# Patient Record
Sex: MALE | Race: WHITE | NOT HISPANIC OR LATINO | Employment: UNEMPLOYED | URBAN - METROPOLITAN AREA
[De-identification: names, ages, dates, MRNs, and addresses within clinical notes are randomized per-mention and may not be internally consistent; named-entity substitution may affect disease eponyms.]

---

## 2017-10-26 ENCOUNTER — GENERIC CONVERSION - ENCOUNTER (OUTPATIENT)
Dept: OTHER | Facility: OTHER | Age: 22
End: 2017-10-26

## 2017-10-26 ENCOUNTER — APPOINTMENT (EMERGENCY)
Dept: RADIOLOGY | Facility: HOSPITAL | Age: 22
DRG: 280 | End: 2017-10-26

## 2017-10-26 ENCOUNTER — APPOINTMENT (EMERGENCY)
Dept: NON INVASIVE DIAGNOSTICS | Facility: HOSPITAL | Age: 22
DRG: 280 | End: 2017-10-26

## 2017-10-26 ENCOUNTER — HOSPITAL ENCOUNTER (INPATIENT)
Facility: HOSPITAL | Age: 22
LOS: 2 days | Discharge: HOME | DRG: 280 | End: 2017-10-28
Attending: EMERGENCY MEDICINE | Admitting: STUDENT IN AN ORGANIZED HEALTH CARE EDUCATION/TRAINING PROGRAM

## 2017-10-26 DIAGNOSIS — I40.9 SUBACUTE MYOCARDITIS, UNSPECIFIED MYOCARDITIS TYPE: Primary | ICD-10-CM

## 2017-10-26 PROBLEM — I42.9 CARDIOMYOPATHY (HCC): Status: ACTIVE | Noted: 2017-10-26

## 2017-10-26 PROBLEM — I21.4 NSTEMI (NON-ST ELEVATED MYOCARDIAL INFARCTION) (HCC): Status: ACTIVE | Noted: 2017-10-26

## 2017-10-26 PROBLEM — R07.9 CHEST PAIN: Status: ACTIVE | Noted: 2017-10-26

## 2017-10-26 LAB
ALBUMIN SERPL BCP-MCNC: 3.8 G/DL (ref 3.5–5)
ALP SERPL-CCNC: 99 U/L (ref 46–116)
ALT SERPL W P-5'-P-CCNC: 63 U/L (ref 12–78)
AMPHETAMINES SERPL QL SCN: NEGATIVE
ANION GAP SERPL CALCULATED.3IONS-SCNC: 7 MMOL/L (ref 4–13)
APTT PPP: 36 SECONDS (ref 23–35)
APTT PPP: 42 SECONDS (ref 23–35)
AST SERPL W P-5'-P-CCNC: 143 U/L (ref 5–45)
ATRIAL RATE: 101 BPM
BARBITURATES UR QL: NEGATIVE
BASOPHILS # BLD AUTO: 0.04 THOUSANDS/ΜL (ref 0–0.1)
BASOPHILS NFR BLD AUTO: 0 % (ref 0–1)
BENZODIAZ UR QL: NEGATIVE
BILIRUB SERPL-MCNC: 0.5 MG/DL (ref 0.2–1)
BUN SERPL-MCNC: 8 MG/DL (ref 5–25)
CALCIUM SERPL-MCNC: 9.1 MG/DL (ref 8.3–10.1)
CHLORIDE SERPL-SCNC: 99 MMOL/L (ref 100–108)
CO2 SERPL-SCNC: 31 MMOL/L (ref 21–32)
COCAINE UR QL: NEGATIVE
CREAT SERPL-MCNC: 0.99 MG/DL (ref 0.6–1.3)
CRP SERPL QL: >90 MG/L
EOSINOPHIL # BLD AUTO: 0.07 THOUSAND/ΜL (ref 0–0.61)
EOSINOPHIL NFR BLD AUTO: 1 % (ref 0–6)
ERYTHROCYTE [DISTWIDTH] IN BLOOD BY AUTOMATED COUNT: 12.9 % (ref 11.6–15.1)
ERYTHROCYTE [DISTWIDTH] IN BLOOD BY AUTOMATED COUNT: 13 % (ref 11.6–15.1)
ERYTHROCYTE [SEDIMENTATION RATE] IN BLOOD: 7 MM/HOUR (ref 0–10)
GFR SERPL CREATININE-BSD FRML MDRD: 109 ML/MIN/1.73SQ M
GLUCOSE SERPL-MCNC: 122 MG/DL (ref 65–140)
HCT VFR BLD AUTO: 46.7 % (ref 36.5–49.3)
HCT VFR BLD AUTO: 49.1 % (ref 36.5–49.3)
HGB BLD-MCNC: 15.9 G/DL (ref 12–17)
HGB BLD-MCNC: 16.7 G/DL (ref 12–17)
INR PPP: 0.99 (ref 0.86–1.16)
LACTATE SERPL-SCNC: 0.9 MMOL/L (ref 0.5–2)
LYMPHOCYTES # BLD AUTO: 1.11 THOUSANDS/ΜL (ref 0.6–4.47)
LYMPHOCYTES NFR BLD AUTO: 10 % (ref 14–44)
MCH RBC QN AUTO: 29.7 PG (ref 26.8–34.3)
MCH RBC QN AUTO: 29.7 PG (ref 26.8–34.3)
MCHC RBC AUTO-ENTMCNC: 34 G/DL (ref 31.4–37.4)
MCHC RBC AUTO-ENTMCNC: 34 G/DL (ref 31.4–37.4)
MCV RBC AUTO: 87 FL (ref 82–98)
MCV RBC AUTO: 87 FL (ref 82–98)
METHADONE UR QL: NEGATIVE
MONOCYTES # BLD AUTO: 1.19 THOUSAND/ΜL (ref 0.17–1.22)
MONOCYTES NFR BLD AUTO: 11 % (ref 4–12)
NEUTROPHILS # BLD AUTO: 8.62 THOUSANDS/ΜL (ref 1.85–7.62)
NEUTS SEG NFR BLD AUTO: 78 % (ref 43–75)
NRBC BLD AUTO-RTO: 0 /100 WBCS
OPIATES UR QL SCN: NEGATIVE
P AXIS: 76 DEGREES
PCP UR QL: NEGATIVE
PLATELET # BLD AUTO: 213 THOUSANDS/UL (ref 149–390)
PLATELET # BLD AUTO: 228 THOUSANDS/UL (ref 149–390)
PMV BLD AUTO: 10 FL (ref 8.9–12.7)
PMV BLD AUTO: 10.1 FL (ref 8.9–12.7)
POTASSIUM SERPL-SCNC: 3.6 MMOL/L (ref 3.5–5.3)
PR INTERVAL: 114 MS
PROT SERPL-MCNC: 7.9 G/DL (ref 6.4–8.2)
PROTHROMBIN TIME: 13.3 SECONDS (ref 12.1–14.4)
QRS AXIS: -60 DEGREES
QRSD INTERVAL: 76 MS
QT INTERVAL: 322 MS
QTC INTERVAL: 417 MS
RBC # BLD AUTO: 5.36 MILLION/UL (ref 3.88–5.62)
RBC # BLD AUTO: 5.62 MILLION/UL (ref 3.88–5.62)
SODIUM SERPL-SCNC: 137 MMOL/L (ref 136–145)
T WAVE AXIS: 62 DEGREES
THC UR QL: NEGATIVE
TROPONIN I SERPL-MCNC: 15.47 NG/ML
TROPONIN I SERPL-MCNC: 16.22 NG/ML
TROPONIN I SERPL-MCNC: 21.61 NG/ML
TROPONIN I SERPL-MCNC: 21.82 NG/ML
VENTRICULAR RATE: 101 BPM
WBC # BLD AUTO: 11.07 THOUSAND/UL (ref 4.31–10.16)
WBC # BLD AUTO: 8.79 THOUSAND/UL (ref 4.31–10.16)

## 2017-10-26 PROCEDURE — 85027 COMPLETE CBC AUTOMATED: CPT | Performed by: NURSE PRACTITIONER

## 2017-10-26 PROCEDURE — 85610 PROTHROMBIN TIME: CPT | Performed by: NURSE PRACTITIONER

## 2017-10-26 PROCEDURE — 87476 LYME DIS DNA AMP PROBE: CPT | Performed by: NURSE PRACTITIONER

## 2017-10-26 PROCEDURE — 71020 HB CHEST X-RAY 2VW FRONTAL&LATL: CPT | Performed by: EMERGENCY MEDICINE

## 2017-10-26 PROCEDURE — 85730 THROMBOPLASTIN TIME PARTIAL: CPT | Performed by: NURSE PRACTITIONER

## 2017-10-26 PROCEDURE — 85652 RBC SED RATE AUTOMATED: CPT | Performed by: NURSE PRACTITIONER

## 2017-10-26 PROCEDURE — 84484 ASSAY OF TROPONIN QUANT: CPT | Performed by: STUDENT IN AN ORGANIZED HEALTH CARE EDUCATION/TRAINING PROGRAM

## 2017-10-26 PROCEDURE — 87633 RESP VIRUS 12-25 TARGETS: CPT | Performed by: NURSE PRACTITIONER

## 2017-10-26 PROCEDURE — 36415 COLL VENOUS BLD VENIPUNCTURE: CPT

## 2017-10-26 PROCEDURE — 80307 DRUG TEST PRSMV CHEM ANLYZR: CPT | Performed by: NURSE PRACTITIONER

## 2017-10-26 PROCEDURE — 85025 COMPLETE CBC W/AUTO DIFF WBC: CPT | Performed by: EMERGENCY MEDICINE

## 2017-10-26 PROCEDURE — 86140 C-REACTIVE PROTEIN: CPT | Performed by: NURSE PRACTITIONER

## 2017-10-26 PROCEDURE — 84484 ASSAY OF TROPONIN QUANT: CPT | Performed by: EMERGENCY MEDICINE

## 2017-10-26 PROCEDURE — 84484 ASSAY OF TROPONIN QUANT: CPT | Performed by: NURSE PRACTITIONER

## 2017-10-26 PROCEDURE — 93306 TTE W/DOPPLER COMPLETE: CPT

## 2017-10-26 PROCEDURE — 83605 ASSAY OF LACTIC ACID: CPT | Performed by: NURSE PRACTITIONER

## 2017-10-26 PROCEDURE — 80053 COMPREHEN METABOLIC PANEL: CPT | Performed by: EMERGENCY MEDICINE

## 2017-10-26 PROCEDURE — 93005 ELECTROCARDIOGRAM TRACING: CPT | Performed by: EMERGENCY MEDICINE

## 2017-10-26 PROCEDURE — 86658 ENTEROVIRUS ANTIBODY: CPT | Performed by: NURSE PRACTITIONER

## 2017-10-26 PROCEDURE — 99285 EMERGENCY DEPT VISIT HI MDM: CPT

## 2017-10-26 RX ORDER — CHLORHEXIDINE GLUCONATE 0.12 MG/ML
15 RINSE ORAL EVERY 12 HOURS SCHEDULED
Status: DISCONTINUED | OUTPATIENT
Start: 2017-10-26 | End: 2017-10-27

## 2017-10-26 RX ORDER — HEPARIN SODIUM 10000 [USP'U]/100ML
3-20 INJECTION, SOLUTION INTRAVENOUS
Status: DISCONTINUED | OUTPATIENT
Start: 2017-10-26 | End: 2017-10-27

## 2017-10-26 RX ORDER — ASPIRIN 81 MG/1
81 TABLET, CHEWABLE ORAL DAILY
Status: DISCONTINUED | OUTPATIENT
Start: 2017-10-26 | End: 2017-10-28

## 2017-10-26 RX ORDER — HEPARIN SODIUM 1000 [USP'U]/ML
3000 INJECTION, SOLUTION INTRAVENOUS; SUBCUTANEOUS AS NEEDED
Status: DISCONTINUED | OUTPATIENT
Start: 2017-10-26 | End: 2017-10-27

## 2017-10-26 RX ORDER — HEPARIN SODIUM 1000 [USP'U]/ML
1500 INJECTION, SOLUTION INTRAVENOUS; SUBCUTANEOUS AS NEEDED
Status: DISCONTINUED | OUTPATIENT
Start: 2017-10-26 | End: 2017-10-27

## 2017-10-26 RX ADMIN — CHLORHEXIDINE GLUCONATE 15 ML: 1.2 RINSE ORAL at 14:39

## 2017-10-26 RX ADMIN — CHLORHEXIDINE GLUCONATE 15 ML: 1.2 RINSE ORAL at 20:16

## 2017-10-26 RX ADMIN — HEPARIN SODIUM 3000 UNITS: 1000 INJECTION, SOLUTION INTRAVENOUS; SUBCUTANEOUS at 20:16

## 2017-10-26 RX ADMIN — HEPARIN SODIUM AND DEXTROSE 12 UNITS/KG/HR: 10000; 5 INJECTION INTRAVENOUS at 14:14

## 2017-10-26 RX ADMIN — ASPIRIN 81 MG 81 MG: 81 TABLET ORAL at 14:12

## 2017-10-26 RX ADMIN — METOPROLOL TARTRATE 12.5 MG: 25 TABLET ORAL at 14:13

## 2017-10-26 NOTE — SOCIAL WORK
CM met with pt and mother Lisa Campos at bedside  Pt lives with his mother in a 2 story house with 10 steps w/railing to reach his bedroom and 3 steps w/railing to enter the residence  Pt has no problem navigating steps and takes care of all ADL's  He uses no DME's  He has never been in rehab or used OP/PT or [facility] services  Denies issues with drugs or alcohol  Denies hx of anxiety or depression  He uses [address] in [facility] and has no problem with his co-pays  He does not have a POA or Advanced Directive and does not want info at this time  He does not work, but does drive  Mother will transport home upon discharge  CM discussed discharge plan  Pt is concerned about not being a [place] citizen and is concerned that he has no insurance  CM will have Josephine Green from financial speak to pt for assistance  Pt is hoping to go home with no needs when medically cleared  CM will continue to follow

## 2017-10-26 NOTE — CONSULTS
Consultation - Cardiology Team One  Jane Marsh 23 y o  male MRN: [id number]  Unit/Bed#:  Encounter: [id number]    Consults    Physician Requesting Consult: Bindu Aguirre MD  Reason for Consult / Principal Problem: subacute myocarditis    HPI: Cardiologist Dr Chase Yanes is a 23y o  year old male who has no past medical history  Pt states for the last couple of weeks he has noticed increasing shortness of breath and chest pain  He notes just not feeling well lately  He states that he has been having chest pain off and on for 1 yr and was seen and advised to see cardiology but never did  He states the pain occurs at rest and exertion  He describes it as a pressure like pain on the left side of his chest  He states it is worse with inspiration and palpation  He also notes shortness of breath with exertion  He for the last couple of days he has been feeling dizzy  He states this am he was getting out of bed and was very dizzy, called for his dad and asked to come to ED  He admits to smoking cigarettes and drinking alcohol but denies illegal drugs  He has no family history of cad  REVIEW OF SYSTEMS:  Constitutional:  +malaise Denies fever or chills   Eyes:  Denies change in visual acuity   HENT:  Denies nasal congestion or sore throat   Respiratory: +shortness of breath  Denies cough   Cardiovascular: +chest pain Denies edema   GI:  Denies abdominal pain, nausea, vomiting, bloody stools or diarrhea   :  Denies dysuria, frequency, difficulty in micturition and nocturia  Musculoskeletal:  Denies back pain or joint pain   Neurologic: +dizziness Denies headache, focal weakness or sensory changes   Endocrine:  Denies polyuria or polydipsia   Lymphatic:  Denies swollen glands   Psychiatric:  Denies depression or anxiety     Historical Information   History reviewed  No pertinent past medical history  History reviewed  No pertinent surgical history    History   Alcohol Use No History   Drug Use No     History   Smoking Status    Never Smoker   Smokeless Tobacco    Never Used       Family History: History reviewed  No pertinent family history  MEDS & ALLERGIES:  all current active meds have been reviewed and current meds: Current Facility-Administered Medications   Medication Dose Route Frequency    aspirin chewable tablet 81 mg  81 mg Oral Daily    chlorhexidine (PERIDEX) 0 12 % oral rinse 15 mL  15 mL Swish & Spit Q12H [address]    heparin (porcine) 25,000 units in 250 mL infusion (premix)  3-20 Units/kg/hr (Order-Specific) Intravenous Titrated    heparin (porcine) injection 1,500 Units  1,500 Units Intravenous PRN    heparin (porcine) injection 3,000 Units  3,000 Units Intravenous PRN    metoprolol tartrate (LOPRESSOR) tablet 12 5 mg  12 5 mg Oral Q12H KAREL       heparin (porcine) 3-20 Units/kg/hr (Order-Specific)     No Known Allergies    OBJECTIVE:  Vitals:   Vitals:    10/26/17 1335   BP: 113/58   Pulse: 99   Resp: 18   Temp: 98 6 °F (37 °C)   SpO2: 97%     Body mass index is 21 73 kg/m²  Systolic (37TPC), DYF:109 , Min:112 , MALIKA:045     Diastolic (85EWQ), ACI:98, Min:58, Max:83    No intake or output data in the 24 hours ending 10/26/17 1343  Weight (last 2 days)     Date/Time   Weight    10/26/17 1335  52 2 (115)    10/26/17 1232  52 5 (115 74)    10/26/17 0924  52 2 (115)            Invasive Devices     Peripheral Intravenous Line            Peripheral IV 10/26/17 Right Antecubital less than 1 day                PHYSICAL EXAMS:  General:  Patient is not in acute distress, laying in the bed comfortably, awake, alert responding to commands  Head: Normocephalic, Atraumatic  HEENT: White sclera, pink conjunctiva,  PERRLA,pharynx benign  Neck:  Supple, no neck vein distention, carotids+2/+2 no bruits, thyromegaly, adenopathy  Respiratory: clear to P/A  Cardiovascular:  Pain noted with palpation and inspiration   PMI normal, S1-S2 normal, No  Murmurs, thrills, gallops, rubs   Regular rhythm  GI:  Abdomen soft nontender   No hepatosplenomegaly, adenopathy, ascites,or rebound tenderness  Extremities: No edema, normal pulses, no calf tenderness, no joint deformities, no venous disease   Integument:  No skin rashes or ulceration  Lymphatic:  No cervical or inguinal lymphadenopathy  Neurologic:  Patient is awake alert, responding to command, well-oriented to time and place and person moving all extremities    LABORATORY RESULTS:    Results from last 7 days  Lab Units 10/26/17  0935   TROPONIN I ng/mL 21 61*     CBC with diff:   Results from last 7 days  Lab Units 10/26/17  0935   WBC Thousand/uL 11 07*   HEMOGLOBIN g/dL 16 7   HEMATOCRIT % 49 1   MCV fL 87   PLATELETS Thousands/uL 228   MCH pg 29 7   MCHC g/dL 34 0   RDW % 12 9   MPV fL 10 0   NRBC AUTO /100 WBCs 0       CMP:  Results from last 7 days  Lab Units 10/26/17  0935   SODIUM mmol/L 137   POTASSIUM mmol/L 3 6   CHLORIDE mmol/L 99*   CO2 mmol/L 31   ANION GAP mmol/L 7   BUN mg/dL 8   CREATININE mg/dL 0 99   GLUCOSE RANDOM mg/dL 122   CALCIUM mg/dL 9 1   AST U/L 143*   ALT U/L 63   ALK PHOS U/L 99   TOTAL PROTEIN g/dL 7 9   ALBUMIN g/dL 3 8   BILIRUBIN TOTAL mg/dL 0 50   EGFR ml/min/1 73sq m 109       BMP:  Results from last 7 days  Lab Units 10/26/17  0935   SODIUM mmol/L 137   POTASSIUM mmol/L 3 6   CHLORIDE mmol/L 99*   CO2 mmol/L 31   BUN mg/dL 8   CREATININE mg/dL 0 99   GLUCOSE RANDOM mg/dL 122   CALCIUM mg/dL 9 1                              Cardiac testing:   Results for orders placed during the hospital encounter of 10/26/17   Echo complete with contrast if indicated    Narrative [address] [place] [address] [place] [id number] [phone]    Transthoracic Echocardiogram  2D, M-mode, Doppler, and Color Doppler    Study date:  26-Oct-2017    Patient: Obed Kawasaki  MR number: [id number]  Account number: [de-identified]  : 1995  Age: 23 years  Gender: Male  Status: Inpatient  Location: Emergency room  Height: 61 in  Weight: 115 lb  BP: 115/ 83 mmHg    Indications: Acute myocarditis, Assess left ventricular function  Diagnoses: I51 4 - Myocarditis, unspecified    Sonographer:  April [facility] Sia Murray  Interpreting Physician:  Caity Goddard MD  Referring Physician:  Edilia Merritt MD  Group:  [facility]    SUMMARY    LEFT VENTRICLE:  Systolic function was severely reduced  Ejection fraction was estimated to be 35 %  There were no regional wall motion abnormalities  There was severe diffuse hypokinesis  MITRAL VALVE:  There was mild regurgitation  TRICUSPID VALVE:  There was mild regurgitation  PULMONIC VALVE:  There was mild regurgitation  IVC, HEPATIC VEINS:  The inferior vena cava was moderately dilated  HISTORY: PRIOR HISTORY: Patient has no history of cardiovascular disease  PROCEDURE: The procedure was performed in the emergency room  This was a routine study  The transthoracic approach was used  The study included complete 2D imaging, M-mode, complete spectral Doppler, and color Doppler  The heart rate was  92 bpm, at the start of the study  Images were obtained from the parasternal, apical, subcostal, and suprasternal notch acoustic windows  Image quality was adequate  LEFT VENTRICLE: Size was normal  Systolic function was severely reduced  Ejection fraction was estimated to be 35 %  There were no regional wall motion abnormalities  There was severe diffuse hypokinesis  Wall thickness was normal   DOPPLER: Left ventricular diastolic function parameters were normal     RIGHT VENTRICLE: The size was normal  Systolic function was normal  Wall thickness was normal     LEFT ATRIUM: Size was normal     RIGHT ATRIUM: Size was normal     MITRAL VALVE: Valve structure was normal  There was normal leaflet separation  DOPPLER: The transmitral velocity was within the normal range  There was no evidence for stenosis   There was mild regurgitation  AORTIC VALVE: The valve was trileaflet  Leaflets exhibited normal thickness and normal cuspal separation  DOPPLER: Transaortic velocity was within the normal range  There was no evidence for stenosis  There was no regurgitation  TRICUSPID VALVE: The valve structure was normal  There was normal leaflet separation  DOPPLER: The transtricuspid velocity was within the normal range  There was no evidence for stenosis  There was mild regurgitation  PULMONIC VALVE: Leaflets exhibited normal thickness, no calcification, and normal cuspal separation  DOPPLER: The transpulmonic velocity was within the normal range  There was mild regurgitation  PERICARDIUM: There was no pericardial effusion  The pericardium was normal in appearance  AORTA: The root exhibited normal size  SYSTEMIC VEINS: IVC: The inferior vena cava was normal in size and course  The inferior vena cava was moderately dilated  Respirophasic changes were normal     SYSTEM MEASUREMENT TABLES    2D  %FS: 24 4 %  Ao Diam: 2 4 cm  EDV(Teich): 122 7 ml  EF(Teich): 48 1 %  ESV(Teich): 63 6 ml  IVSd: 0 6 cm  LA Area: 12 6 cm2  LA Diam: 2 8 cm  LVEDV MOD A4C: 100 9 ml  LVEF MOD A4C: 46 8 %  LVESV MOD A4C: 53 7 ml  LVIDd: 5 1 cm  LVIDs: 3 8 cm  LVLd A4C: 9 1 cm  LVLs A4C: 7 6 cm  LVPWd: 0 9 cm  RA Area: 15 cm2  RVIDd: 3 cm  SV MOD A4C: 47 2 ml  SV(Teich): 59 ml    CW  TR Vmax: 2 1 m/s  TR maxP mmHg    MM  TAPSE: 1 7 cm    PW  E': 0 1 m/s  E/E': 6 1  MV A Sudhakar: 0 6 m/s  MV Dec Hardin: 3 8 m/s2  MV DecT: 159 2 ms  MV E Sudhakar: 0 6 m/s  MV E/A Ratio: 1 1  MV PHT: 46 2 ms  MVA By PHT: 4 8 cm2    [employer] Accredited Echocardiography Laboratory    Prepared and electronically signed by    Yarely Lei MD  Signed 26-Oct-2017 12:55:23             Imaging:   I have personally reviewed pertinent reports  EKG reviewed personally:  Sr with subtle st elevated V1-V6    Telemetry reviewed personally:   SR    Assessment/Plan:  1  NSTEMI likely type 2 secondary to myocarditis -- trop 21  Trend trop  Stat echo done at the bedside : EF 35%, severe diffuse hypokinesis  Add heparin gtt, lopressor, ASA  Monitor vitals  Monitor ekg  CRP elevated  ESR negative  Lyme pending  Code Status: Level 1 - Full Code    Counseling / Coordination of Care  Total floor / unit time spent today 35 minutes  Greater than 50% of total time was spent with the patient and / or family counseling and / or coordination of care  A description of the counseling / coordination of care: Review of history, current assessment, development of a plan      Pretty Staley PA-C  10/26/2017,1:43 PM

## 2017-10-26 NOTE — H&P
History and Physical - [address]  Arden Haynes 23 y o  male MRN: [id number]  Unit/Bed#:  Encounter: [id number]    Reason for Admission / Chief Complaint: chest pain     History of Present Illness:  Josee Ricardo is a 23 y o  male who presents with no past medical history  He states that he had chest pain approximately 1 year ago, in which he sought care at an urgent care center  He does not recall the surrounding context of the chest pain at this time  But, feels that "it came out of no where"  He was encouraged to seek care from a cardiologist   However, he did not  He stated that he has had chest pain intermittently over the course of the year  Over the past couple of weeks he describes increasing shortness of breath and chest pain, associated with fatigue  He states that the pain is on the left side of his chest that worsens with pressure and inspiration  He had chest pain today that was associated with dizziness and he felt like he "he passed out for 5 seconds" and this had not happened before which prompted his admission  He admits to smoking cigarettes and occasionally drinking alcohol  He denies and ilicit drug use  He denies any sick contacts  He is not an athlete or play contact sports  He denies a family history of CAD  History obtained from chart review and the patient  Past Medical History:  History reviewed  No pertinent past medical history  Past Surgical History:  History reviewed  No pertinent surgical history  Past Family History:  History reviewed  No pertinent family history      Social History:  History   Smoking Status    Never Smoker   Smokeless Tobacco    Never Used     History   Alcohol Use No     History   Drug Use No     Medications:  Current Facility-Administered Medications   Medication Dose Route Frequency    aspirin chewable tablet 81 mg  81 mg Oral Daily    chlorhexidine (PERIDEX) 0 12 % oral rinse 15 mL  15 mL Swish & Spit Q12H [address]    heparin (porcine) 25,000 units in 250 mL infusion (premix)  3-20 Units/kg/hr (Order-Specific) Intravenous Titrated    heparin (porcine) injection 1,500 Units  1,500 Units Intravenous PRN    heparin (porcine) injection 3,000 Units  3,000 Units Intravenous PRN    metoprolol tartrate (LOPRESSOR) tablet 12 5 mg  12 5 mg Oral Q12H [address]     Home medications:  Prior to Admission medications    Not on File     Allergies:  No Known Allergies    ROS:   Review of Systems   Constitutional: Negative  HENT: Negative  Eyes: Negative  Respiratory: Positive for shortness of breath  Cardiovascular: Positive for chest pain  Gastrointestinal: Positive for nausea  Endocrine: Negative  Genitourinary: Negative  Musculoskeletal: Negative  Skin: Negative  Allergic/Immunologic: Negative  Neurological: Positive for dizziness  Hematological: Negative  Psychiatric/Behavioral: Negative  Vitals:  Vitals:    10/26/17 1300 10/26/17 1335 10/26/17 1400 10/26/17 1500   BP: 109/66 113/58 109/61 107/62   Pulse: 101 99 95 97   Resp: 20 18 15 18   Temp:  98 6 °F (37 °C)     TempSrc:  Oral     SpO2: 97% 97% 99% 97%   Weight:  52 2 kg (115 lb)     Height:  5' 1" (1 549 m)       Temperature:   Temp (24hrs), Av 6 °F (37 °C), Min:98 6 °F (37 °C), Max:98 6 °F (37 °C)    Current Temperature: 98 6 °F (37 °C)    Weights:   IBW: 52 3 kg  Body mass index is 21 73 kg/m²  Hemodynamic Monitoring:  N/A     Non-Invasive/Invasive Ventilation Settings:  Respiratory    Lab Data (Last 4 hours)    None         O2/Vent Data (Last 4 hours)    None              No results found for: PHART, PZM7OBA, PO2ART, GRB5YSS, T4MRBZBQ, BEART, SOURCE  SpO2: SpO2: 98 %    Physical Exam  PHYSICAL EXAM  General :   Well developed, well nourished, age appropriate affect, behavior, orientation, thought content, thought processes, judgement, and insight  Articulate  English primary language  Neuro:   GCS= 15  CN 2-12 intact   Non Focal  HEENT:  Normocephalic, atraumatic, Pupils 3 brisk bilat  PERRLA, EOMI, hearing grossly intact  Symmetrical facial expressions without droop or slurred speech  Tongue midline without fasiculations  Neck:  No JVD, FROM, No masses/adenopathy  Back:   Symmetrical, atruamatic, spinous process pain free on palpation  Cardiovascular:   No heaves,lifts,thrills  S1/S2 Dominie@[employer] [place] No noted MRGC  Pulmonary:   Symmetrical expansion of chest  Resp even & unlabored  GI :   Abd SNT + BSX4 quads  No palp/pulsitile masses  :  N/A  Musculoskeletal:  Symmetrical  No obvious deformity  FROM in UE & LE  Muscle strength 5/5  No lymphadenopathy  Extrem warm to touch  DTR grossly intact  Brachial/radial/femoral/popliteal/pedal/posterior tibial pulses +2  No lesions noted  CN 2-12 grossly intact  No rhomberg  Sensation and motor function intact  Labs:    Results from last 7 days  Lab Units 10/26/17  1344 10/26/17  0935   WBC Thousand/uL 8 79 11 07*   HEMOGLOBIN g/dL 15 9 16 7   HEMATOCRIT % 46 7 49 1   PLATELETS Thousands/uL 213 228   NEUTROS PCT %  --  78*   MONOS PCT %  --  11      Results from last 7 days  Lab Units 10/26/17  0935   SODIUM mmol/L 137   POTASSIUM mmol/L 3 6   CHLORIDE mmol/L 99*   CO2 mmol/L 31   BUN mg/dL 8   CREATININE mg/dL 0 99   CALCIUM mg/dL 9 1   TOTAL PROTEIN g/dL 7 9   BILIRUBIN TOTAL mg/dL 0 50   ALK PHOS U/L 99   ALT U/L 63   AST U/L 143*   GLUCOSE RANDOM mg/dL 122                Results from last 7 days  Lab Units 10/26/17  1344   INR  0 99   PTT seconds 36*       Results from last 7 days  Lab Units 10/26/17  1344   LACTIC ACID mmol/L 0 9       0  Lab Value Date/Time   TROPONINI 21 82 (H) 10/26/2017 1345   TROPONINI 21 61 (H) 10/26/2017 0935       Imaging:   X-ray chest 2 views   Final Result      No active pulmonary disease  Workstation performed: [id number]           EKG: ST This was personally reviewed by myself     Micro:  No results found for: Steven Mcgee, Maria Eugenia Gomez    ______________________________________________________________________    Assessment:   1  Pleuritic Chest pain myocarditis vs  Costochondritis   2  Cardiomyopathy of unknown etiology rule out CAD  3  Tobacco abuse  4  Alcohol use    Plan:      Neuro:   -monitor neuro status  -CAM ICU   -sleep hygiene   -UDS negative    CV:   -ECHO showing severely decreased systolic function, EF 25% with no regional wall motion abnormalities and severe diffuse hypokinesis   -trend troponins   -start ASA  -start heparin gtt  -start low dose BB  -monitor closely for arrhythmia   -cardiology following, appreciate recommendations  -will likely have cardiac catheterization on Monday    Lung:   -monitor respiratory status closely  -early mobility    GI:   -cardiac diet as tolerated  -NPO Sunday evening    FEN:   -monitor electrolytes and replete as necessary    :   -monitor urine output closely    ID:   -no current acute infectious etiology   -will check viral panel and coxsackie A/B to assess for viral source of myocarditis    Heme:   -heparin gtt for ACS, this will also cover for dvt prophylaxis   Endo:   -monitor glucose via bmp    Msk/Skin:   -early mobilization    Disposition:   -will monitor in step down     ______________________________________________________________________    VTE Pharmacologic Prophylaxis: Sequential compression device (Venodyne)  and Heparin  VTE Mechanical Prophylaxis: sequential compression device    Invasive lines and devices: Invasive Devices     Peripheral Intravenous Line            Peripheral IV 10/26/17 Right Antecubital less than 1 day                Code Status: Level 1 - Full Code  POA:    POLST:      Given critical illness, patient length of stay will require greater than two midnights  Counseling / Coordination of Care  Total Critical Care time spent 34 minutes excluding procedures, teaching and family updates        Portions of the record may have been created with voice recognition software  Occasional wrong word or "sound a like" substitutions may have occurred due to the inherent limitations of voice recognition software  Read the chart carefully and recognize, using context, where substitutions have occurred        DIANA Stanton

## 2017-10-26 NOTE — PLAN OF CARE
Problem: DISCHARGE PLANNING - CARE MANAGEMENT  Goal: Discharge to post-acute care or home with appropriate resources  INTERVENTIONS:  - Conduct assessment to determine patient/family and health care team treatment goals, and need for post-acute services based on payer coverage, community resources, and patient preferences, and barriers to discharge  - Address psychosocial, clinical, and financial barriers to discharge as identified in assessment in conjunction with the patient/family and health care team  - Arrange appropriate level of post-acute services according to patient's   needs and preference and payer coverage in collaboration with the physician and health care team  - Communicate with and update the patient/family, physician, and health care team regarding progress on the discharge plan  - Arrange appropriate transportation to post-acute venues   Outcome: Progressing  CM met with pt and mother Chris Tejeda at bedside  Pt lives with his mother in a 2 story house with 10 steps w/railing to reach his bedroom and 3 steps w/railing to enter the residence  Pt has no problem navigating steps and takes care of all ADL's  He uses no DME's  He has never been in rehab or used OP/PT or [facility] services  Denies issues with drugs or alcohol  Denies hx of anxiety or depression  He uses [address] in [facility] and has no problem with his co-pays  He does not have a POA or Advanced Directive and does not want info at this time  He does not work, but does drive  Mother will transport home upon discharge  CM discussed discharge plan  Pt is concerned about not being a [place] citizen and is concerned that he has no insurance  CM will have Lety Knox from financial speak to pt for assistance  Pt is hoping to go home with no needs when medically cleared  CM will continue to follow

## 2017-10-26 NOTE — ED PROVIDER NOTES
History  Chief Complaint   Patient presents with    Chest Pain     Pt with chest pain x's 2 days, patient was to "go see a heart specialist for heart problems but never went"  Pt c/o pain when he takes a deep breath in      32-year-old male patient presenting with a chief complaint of chest pain  Chest pain is generalized and waxes and wanes  He had had pain about a year ago and was instructed to follow up with Cardiology but never did  Her last 2-3 weeks his pain has been increasing  He has some pain with inspiration  He has pain with exertion  He has pain with position change  He denies any illicit drug use  No nausea no vomiting no diarrhea  No fever  History provided by:  Patient and relative      None       History reviewed  No pertinent past medical history  History reviewed  No pertinent surgical history  History reviewed  No pertinent family history  I have reviewed and agree with the history as documented  Social History   Substance Use Topics    Smoking status: Never Smoker    Smokeless tobacco: Never Used    Alcohol use No        Review of Systems   Constitutional: Positive for fatigue  Negative for diaphoresis and fever  HENT: Negative for congestion, ear pain, nosebleeds and sore throat  Eyes: Negative for photophobia, pain, discharge and visual disturbance  Respiratory: Negative for cough, choking, chest tightness, shortness of breath and wheezing  Cardiovascular: Positive for chest pain  Negative for palpitations  Gastrointestinal: Negative for abdominal distention, abdominal pain, diarrhea and vomiting  Genitourinary: Negative for dysuria, flank pain and frequency  Musculoskeletal: Negative for back pain, gait problem and joint swelling  Skin: Negative for color change and rash  Neurological: Negative for dizziness, syncope and headaches  Psychiatric/Behavioral: Negative for behavioral problems and confusion  The patient is not nervous/anxious  All other systems reviewed and are negative  Physical Exam  ED Triage Vitals [10/26/17 0924]   Temperature Pulse Respirations Blood Pressure SpO2   98 6 °F (37 °C) 103 16 115/83 99 %      Temp Source Heart Rate Source Patient Position - Orthostatic VS BP Location FiO2 (%)   Oral Monitor Sitting Right arm --      Pain Score       4           Orthostatic Vital Signs  Vitals:    10/26/17 1335 10/26/17 1400 10/26/17 1500 10/26/17 1600   BP: 113/58 109/61 107/62 102/62   Pulse: 99 95 97 95   Patient Position - Orthostatic VS: Lying          Physical Exam   Constitutional: He is oriented to person, place, and time  He appears well-developed and well-nourished  HENT:   Head: Normocephalic and atraumatic  Eyes: Pupils are equal, round, and reactive to light  Neck: Normal range of motion  Neck supple  Cardiovascular: Normal rate, regular rhythm, normal heart sounds and normal pulses  PMI is not displaced  Pulmonary/Chest: Effort normal and breath sounds normal  No respiratory distress  Abdominal: Soft  He exhibits no distension  There is no guarding  Musculoskeletal: Normal range of motion  Lymphadenopathy:     He has no cervical adenopathy  Neurological: He is alert and oriented to person, place, and time  Skin: Skin is warm and dry  No rash noted  He is not diaphoretic  No pallor  Psychiatric: He has a normal mood and affect  Vitals reviewed        ED Medications  Medications   chlorhexidine (PERIDEX) 0 12 % oral rinse 15 mL (15 mL Swish & Spit Given 10/26/17 1439)   metoprolol tartrate (LOPRESSOR) tablet 12 5 mg (12 5 mg Oral Given 10/26/17 1413)   aspirin chewable tablet 81 mg (81 mg Oral Given 10/26/17 1412)   heparin (porcine) 25,000 units in 250 mL infusion (premix) ( Intravenous Canceled Entry 10/26/17 1437)   heparin (porcine) injection 3,000 Units (not administered)   heparin (porcine) injection 1,500 Units (not administered)       Diagnostic Studies  Results Reviewed     Procedure Component Value Units Date/Time    Lyme disease, PCR [[id number]] Collected:  10/26/17 1501    Lab Status: In process Specimen:  Blood from Arm, Right Updated:  10/26/17 1506    Respiratory Pathogen Profile, PCR [[id number]] Collected:  10/26/17 1454    Lab Status: In process Specimen:  Nasopharyngeal from Nasopharyngeal Swab Updated:  10/26/17 1506    Lactic acid STAT [[id number]]  (Normal) Collected:  10/26/17 1344    Lab Status:  Final result Specimen:  Blood from Arm, Right Updated:  10/26/17 1414     LACTIC ACID 0 9 mmol/L     Narrative:         Result may be elevated if tourniquet was used during collection  APTT six (6) hours after Heparin bolus/drip initiation or dosing change [[id number]]  (Abnormal) Collected:  10/26/17 1344    Lab Status:  Final result Specimen:  Blood from Arm, Right Updated:  10/26/17 1411     PTT 36 (H) seconds     Narrative: Therapeutic Heparin Range = 60-90 seconds    Protime-INR [[id number]]  (Normal) Collected:  10/26/17 1344    Lab Status:  Final result Specimen:  Blood from Arm, Right Updated:  10/26/17 1411     Protime 13 3 seconds      INR 0 99    CBC [[id number]]  (Normal) Collected:  10/26/17 1344    Lab Status:  Final result Specimen:  Blood from Arm, Right Updated:  10/26/17 1357     WBC 8 79 Thousand/uL      RBC 5 36 Million/uL      Hemoglobin 15 9 g/dL      Hematocrit 46 7 %      MCV 87 fL      MCH 29 7 pg      MCHC 34 0 g/dL      RDW 13 0 %      Platelets 236 Thousands/uL      MPV 10 1 fL     Coxsackie B [[id number]] Collected:  10/26/17 1345    Lab Status: In process Specimen:  Arm, Right Updated:  10/26/17 1350    Coxsackie A [[id number]] Collected:  10/26/17 1345    Lab Status:   In process Specimen:  Arm, Right Updated:  10/26/17 1350    C-reactive protein [[id number]]  (Abnormal) Collected:  10/26/17 0935    Lab Status:  Final result Specimen:  Blood from Arm, Right Updated:  10/26/17 1345     CRP >90 0 (H) mg/L     Rapid drug screen, urine [[id number]]  (Normal) Collected:  10/26/17 1208    Lab Status:  Final result Specimen:  Urine from Urine, Clean Catch Updated:  10/26/17 1221     Amph/Meth UR Negative     Barbiturate Ur Negative     Benzodiazepine Urine Negative     Cocaine Urine Negative     Methadone Urine Negative     Opiate Urine Negative     PCP Ur Negative     THC Urine Negative    Narrative:         FOR MEDICAL PURPOSES ONLY  IF CONFIRMATION NEEDED PLEASE CONTACT THE LAB WITHIN 5 DAYS  Drug Screen Cutoff Levels:  AMPHETAMINE/METHAMPHETAMINES  1000 ng/mL  BARBITURATES     200 ng/mL  BENZODIAZEPINES     200 ng/mL  COCAINE      300 ng/mL  METHADONE      300 ng/mL  OPIATES      300 ng/mL  PHENCYCLIDINE     25 ng/mL  THC       50 ng/mL    Sedimentation rate, automated [[id number]]  (Normal) Collected:  10/26/17 0935    Lab Status:  Final result Specimen:  Blood from Arm, Right Updated:  10/26/17 1156     Sed Rate 7 mm/hour     Troponin I [[id number]]  (Abnormal) Collected:  10/26/17 0935    Lab Status:  Final result Specimen:  Blood from Arm, Right Updated:  10/26/17 1019     Troponin I 21 61 (H) ng/mL     Narrative:         [employer] Chemistry analyzer 99% cutoff is > 0 04 ng/mL in network labs    o cTnI 99% cutoff is useful only when applied to patients in the clinical setting of myocardial ischemia  o cTnI 99% cutoff should be interpreted in the context of clinical history, ECG findings and possibly cardiac imaging to establish correct diagnosis  o cTnI 99% cutoff may be suggestive but clearly not indicative of a coronary event without the clinical setting of myocardial ischemia      Comprehensive metabolic panel [[id number]]  (Abnormal) Collected:  10/26/17 0935    Lab Status:  Final result Specimen:  Blood from Arm, Right Updated:  10/26/17 1004     Sodium 137 mmol/L      Potassium 3 6 mmol/L      Chloride 99 (L) mmol/L      CO2 31 mmol/L      Anion Gap 7 mmol/L      BUN 8 mg/dL      Creatinine 0 99 mg/dL      Glucose 122 mg/dL      Calcium 9 1 mg/dL       (H) U/L      ALT 63 U/L      Alkaline Phosphatase 99 U/L      Total Protein 7 9 g/dL      Albumin 3 8 g/dL      Total Bilirubin 0 50 mg/dL      eGFR 109 ml/min/1 73sq m     Narrative:         National Kidney Disease Education Program recommendations are as follows:  GFR calculation is accurate only with a steady state creatinine  Chronic Kidney disease less than 60 ml/min/1 73 sq  meters  Kidney failure less than 15 ml/min/1 73 sq  meters  CBC and differential [[id number]]  (Abnormal) Collected:  10/26/17 0935    Lab Status:  Final result Specimen:  Blood from Arm, Right Updated:  10/26/17 0947     WBC 11 07 (H) Thousand/uL      RBC 5 62 Million/uL      Hemoglobin 16 7 g/dL      Hematocrit 49 1 %      MCV 87 fL      MCH 29 7 pg      MCHC 34 0 g/dL      RDW 12 9 %      MPV 10 0 fL      Platelets 009 Thousands/uL      nRBC 0 /100 WBCs      Neutrophils Relative 78 (H) %      Lymphocytes Relative 10 (L) %      Monocytes Relative 11 %      Eosinophils Relative 1 %      Basophils Relative 0 %      Neutrophils Absolute 8 62 (H) Thousands/µL      Lymphocytes Absolute 1 11 Thousands/µL      Monocytes Absolute 1 19 Thousand/µL      Eosinophils Absolute 0 07 Thousand/µL      Basophils Absolute 0 04 Thousands/µL                  X-ray chest 2 views   Final Result by Jarrett Rowell DO (10/26 2232)      No active pulmonary disease           Workstation performed: [id number]                    Procedures  ECG 12 Lead Documentation  Date/Time: 10/26/2017 9:27 AM  Performed by: Felicitas Record  Authorized by: Vlad Kauffman     Indications / Diagnosis:  Chest pain  ECG reviewed by me, the ED Provider: yes    Patient location:  ED  Previous ECG:     Previous ECG:  Unavailable  Interpretation:     Interpretation: abnormal    Rate:     ECG rate:  101    ECG rate assessment: normal    Rhythm:     Rhythm: sinus rhythm    Ectopy:     Ectopy: none    QRS:     QRS axis:  Normal  ST segments:     ST segments:  Non-specific    Elevation: V2    Depression:  V4 and V5  T waves:     T waves comment:  Concave  CriticalCare Time  Performed by: Nando Concepcion  Authorized by: Lynn Medrano     Critical care provider statement:     Critical care time (minutes):  30    Critical care was necessary to treat or prevent imminent or life-threatening deterioration of the following conditions:  Cardiac failure, circulatory failure and shock    Critical care was time spent personally by me on the following activities:  Blood draw for specimens, obtaining history from patient or surrogate, discussions with consultants, examination of patient, re-evaluation of patient's condition, ordering and review of radiographic studies, ordering and review of laboratory studies and ordering and performing treatments and interventions           Phone Contacts  ED Phone Contact    ED Course  ED Course                                MDM  Number of Diagnoses or Management Options  Subacute myocarditis, unspecified myocarditis type: new and requires workup  Diagnosis management comments: Case was discussed with Cardiology  Bedside echo is showing ejection fraction of approximately 25% this is very concerning  Patient will require ICU admission for further evaluation may need cardiac catheterization in the near future         Amount and/or Complexity of Data Reviewed  Clinical lab tests: reviewed and ordered  Tests in the radiology section of CPT®: reviewed and ordered  Tests in the medicine section of CPT®: reviewed and ordered  Discussion of test results with the performing providers: yes  Obtain history from someone other than the patient: yes  Review and summarize past medical records: yes  Discuss the patient with other providers: yes  Independent visualization of images, tracings, or specimens: yes      CritCare Time    Disposition  Final diagnoses:   Subacute myocarditis, unspecified myocarditis type     Time reflects when diagnosis was documented in both MDM as applicable and the Disposition within this note     Time User Action Codes Description Comment    10/26/2017 10:51 AM Jennifer Castañeda Add [I40 9] Subacute myocarditis, unspecified myocarditis type       ED Disposition     ED Disposition Condition Comment    Admit  Case was discussed with matthew and the patient's admission status was agreed to be Admission Status: inpatient status to the service of Dr Vera Boston   Follow-up Information    None       There are no discharge medications for this patient  No discharge procedures on file      ED Provider  Electronically Signed by           DIANA Anderson  10/26/17 6058

## 2017-10-27 ENCOUNTER — APPOINTMENT (INPATIENT)
Dept: INTERVENTIONAL RADIOLOGY/VASCULAR | Facility: HOSPITAL | Age: 22
DRG: 280 | End: 2017-10-27
Attending: INTERNAL MEDICINE

## 2017-10-27 PROBLEM — I50.21 ACUTE SYSTOLIC HEART FAILURE (HCC): Status: ACTIVE | Noted: 2017-10-27

## 2017-10-27 LAB
APTT PPP: 42 SECONDS (ref 23–35)
APTT PPP: 57 SECONDS (ref 23–35)
ATRIAL RATE: 68 BPM
HIV 1+2 AB+HIV1 P24 AG SERPL QL IA: NORMAL
HIV1 P24 AG SER QL: NORMAL
P AXIS: 71 DEGREES
PR INTERVAL: 124 MS
QRS AXIS: -54 DEGREES
QRSD INTERVAL: 84 MS
QT INTERVAL: 380 MS
QTC INTERVAL: 404 MS
T WAVE AXIS: -55 DEGREES
TROPONIN I SERPL-MCNC: 13.77 NG/ML
TSH SERPL DL<=0.05 MIU/L-ACNC: 2.23 UIU/ML (ref 0.46–3.98)
VENTRICULAR RATE: 68 BPM

## 2017-10-27 PROCEDURE — 4A023N7 MEASUREMENT OF CARDIAC SAMPLING AND PRESSURE, LEFT HEART, PERCUTANEOUS APPROACH: ICD-10-PCS | Performed by: INTERNAL MEDICINE

## 2017-10-27 PROCEDURE — C1894 INTRO/SHEATH, NON-LASER: HCPCS | Performed by: PHYSICIAN ASSISTANT

## 2017-10-27 PROCEDURE — 85730 THROMBOPLASTIN TIME PARTIAL: CPT | Performed by: STUDENT IN AN ORGANIZED HEALTH CARE EDUCATION/TRAINING PROGRAM

## 2017-10-27 PROCEDURE — 93458 L HRT ARTERY/VENTRICLE ANGIO: CPT | Performed by: PHYSICIAN ASSISTANT

## 2017-10-27 PROCEDURE — 85730 THROMBOPLASTIN TIME PARTIAL: CPT | Performed by: PHYSICIAN ASSISTANT

## 2017-10-27 PROCEDURE — C1769 GUIDE WIRE: HCPCS | Performed by: PHYSICIAN ASSISTANT

## 2017-10-27 PROCEDURE — 99152 MOD SED SAME PHYS/QHP 5/>YRS: CPT | Performed by: PHYSICIAN ASSISTANT

## 2017-10-27 PROCEDURE — 84484 ASSAY OF TROPONIN QUANT: CPT | Performed by: PHYSICIAN ASSISTANT

## 2017-10-27 PROCEDURE — 84443 ASSAY THYROID STIM HORMONE: CPT | Performed by: STUDENT IN AN ORGANIZED HEALTH CARE EDUCATION/TRAINING PROGRAM

## 2017-10-27 PROCEDURE — 93005 ELECTROCARDIOGRAM TRACING: CPT | Performed by: PHYSICIAN ASSISTANT

## 2017-10-27 PROCEDURE — 87806 HIV AG W/HIV1&2 ANTB W/OPTIC: CPT | Performed by: STUDENT IN AN ORGANIZED HEALTH CARE EDUCATION/TRAINING PROGRAM

## 2017-10-27 PROCEDURE — B215YZZ FLUOROSCOPY OF LEFT HEART USING OTHER CONTRAST: ICD-10-PCS | Performed by: INTERNAL MEDICINE

## 2017-10-27 PROCEDURE — 86747 PARVOVIRUS ANTIBODY: CPT | Performed by: PHYSICIAN ASSISTANT

## 2017-10-27 PROCEDURE — B211YZZ FLUOROSCOPY OF MULTIPLE CORONARY ARTERIES USING OTHER CONTRAST: ICD-10-PCS | Performed by: INTERNAL MEDICINE

## 2017-10-27 RX ORDER — SODIUM CHLORIDE 9 MG/ML
75 INJECTION, SOLUTION INTRAVENOUS CONTINUOUS
Status: DISPENSED | OUTPATIENT
Start: 2017-10-27 | End: 2017-10-27

## 2017-10-27 RX ORDER — LIDOCAINE HYDROCHLORIDE 10 MG/ML
INJECTION, SOLUTION INFILTRATION; PERINEURAL CODE/TRAUMA/SEDATION MEDICATION
Status: COMPLETED | OUTPATIENT
Start: 2017-10-27 | End: 2017-10-27

## 2017-10-27 RX ORDER — MIDAZOLAM HYDROCHLORIDE 1 MG/ML
INJECTION INTRAMUSCULAR; INTRAVENOUS CODE/TRAUMA/SEDATION MEDICATION
Status: COMPLETED | OUTPATIENT
Start: 2017-10-27 | End: 2017-10-27

## 2017-10-27 RX ORDER — FENTANYL CITRATE 50 UG/ML
INJECTION, SOLUTION INTRAMUSCULAR; INTRAVENOUS CODE/TRAUMA/SEDATION MEDICATION
Status: COMPLETED | OUTPATIENT
Start: 2017-10-27 | End: 2017-10-27

## 2017-10-27 RX ORDER — VERAPAMIL HCL 2.5 MG/ML
AMPUL (ML) INTRAVENOUS CODE/TRAUMA/SEDATION MEDICATION
Status: COMPLETED | OUTPATIENT
Start: 2017-10-27 | End: 2017-10-27

## 2017-10-27 RX ORDER — SODIUM CHLORIDE, SODIUM LACTATE, POTASSIUM CHLORIDE, CALCIUM CHLORIDE 600; 310; 30; 20 MG/100ML; MG/100ML; MG/100ML; MG/100ML
100 INJECTION, SOLUTION INTRAVENOUS CONTINUOUS
Status: DISCONTINUED | OUTPATIENT
Start: 2017-10-27 | End: 2017-10-28 | Stop reason: HOSPADM

## 2017-10-27 RX ORDER — SODIUM CHLORIDE 9 MG/ML
75 INJECTION, SOLUTION INTRAVENOUS CONTINUOUS
Status: DISCONTINUED | OUTPATIENT
Start: 2017-10-27 | End: 2017-10-27

## 2017-10-27 RX ORDER — ASPIRIN 81 MG/1
324 TABLET, CHEWABLE ORAL ONCE
Status: DISCONTINUED | OUTPATIENT
Start: 2017-10-27 | End: 2017-10-27

## 2017-10-27 RX ORDER — NITROGLYCERIN 20 MG/100ML
INJECTION INTRAVENOUS CODE/TRAUMA/SEDATION MEDICATION
Status: COMPLETED | OUTPATIENT
Start: 2017-10-27 | End: 2017-10-27

## 2017-10-27 RX ADMIN — LIDOCAINE HYDROCHLORIDE 1 ML: 10 INJECTION, SOLUTION INFILTRATION; PERINEURAL at 09:56

## 2017-10-27 RX ADMIN — VERAPAMIL HYDROCHLORIDE 2.5 MG: 2.5 INJECTION, SOLUTION INTRAVENOUS at 09:58

## 2017-10-27 RX ADMIN — IOHEXOL 35 ML: 350 INJECTION, SOLUTION INTRAVENOUS at 10:07

## 2017-10-27 RX ADMIN — MIDAZOLAM HYDROCHLORIDE 2 MG: 1 INJECTION, SOLUTION INTRAMUSCULAR; INTRAVENOUS at 09:55

## 2017-10-27 RX ADMIN — SODIUM CHLORIDE 75 ML/HR: 0.9 INJECTION, SOLUTION INTRAVENOUS at 11:01

## 2017-10-27 RX ADMIN — ASPIRIN 81 MG 81 MG: 81 TABLET ORAL at 09:03

## 2017-10-27 RX ADMIN — NITROGLYCERIN 200 MCG: 20 INJECTION INTRAVENOUS at 09:58

## 2017-10-27 RX ADMIN — HEPARIN SODIUM 3000 UNITS: 1000 INJECTION, SOLUTION INTRAVENOUS; SUBCUTANEOUS at 02:45

## 2017-10-27 RX ADMIN — METOPROLOL TARTRATE 12.5 MG: 25 TABLET ORAL at 01:22

## 2017-10-27 RX ADMIN — SODIUM CHLORIDE, SODIUM LACTATE, POTASSIUM CHLORIDE, AND CALCIUM CHLORIDE 100 ML/HR: .6; .31; .03; .02 INJECTION, SOLUTION INTRAVENOUS at 09:04

## 2017-10-27 RX ADMIN — METOPROLOL TARTRATE 12.5 MG: 25 TABLET ORAL at 09:03

## 2017-10-27 RX ADMIN — FENTANYL CITRATE 50 MCG: 50 INJECTION, SOLUTION INTRAMUSCULAR; INTRAVENOUS at 09:55

## 2017-10-27 RX ADMIN — HEPARIN SODIUM 1500 UNITS: 1000 INJECTION, SOLUTION INTRAVENOUS; SUBCUTANEOUS at 09:11

## 2017-10-27 RX ADMIN — METOPROLOL TARTRATE 12.5 MG: 25 TABLET ORAL at 22:00

## 2017-10-27 NOTE — PROGRESS NOTES
Progress Note - Critical Care   Jane Marsh 23 y o  male MRN: [id number]  Unit/Bed#:  Encounter: [id number]      Assessment and Plan  Principal Problem:    Cardiomyopathy [facility])  Active Problems:    NSTEMI (non-ST elevated myocardial infarction) ([facility] [address] )    Chest pain  Resolved Problems:    * No resolved hospital problems  *      Neuro:  No issues  UDS negative  Continue daily CAM assessments  CV:  Chest Pain unclear etiology  Appears to be pleuritic in quality  Viral panels as well as Lyme panel out for myocarditis  Cardiomyopathy unclear etiology  See above  Cardiology following  NSTEMI aspirin, nitro and beta-blocker  Cardiac catheterization on Monday per Cardiology  Continue on cardio/pulm monitoring  IV access:  Peripheral IVs      Pulm:  No issues  On room air oxygen  Encourage deep breathing/coughing/IS/PulmToileting at oxygen for saturations less than 92%    GI:  No issues  Bowel Reg:  None indicated  Stress Ulcer Prophylaxis none indicated       :  Normal BUN and creatinine  Follow up a m  labs  Accurate I and Os  F/E/N:  Follow up a m  labs    Diet: Cardiac Diet without IVF     Heme:    No issues  DVT Prophylaxis Hep gtt  SCDs in place bilaterally     ID:  Afebrile  Mild leukocyte count on initial CBC but resolved on subsequent yesterday afternoon  Follow-up Lyme titer, follow-up respiratory PCR, follow-up Coxsackie PCRs  Endo:  No history of diabetes earlier thyroid dysfunction  Follow sugars on a m  chemistries    MSK/Skin:  Out of bed as tolerated with assistance  PT/OT consultation     Dispo:  Continue step-down level of care      Counseling / Coordination of Care: Total Critical Care time spent 0 minutes excluding procedures, teaching and family updates  Chief Complaint:  "I feel fine"    HPI/24hr events:   68-year-old male presented with intermittent chest pain x1 year ago after a syncopal episode    Found to have a significant troponin elevation and significant cardiomyopathy with EF of 35%  Patient was asked about travel [place] she denied  Patient did however spend time down [facility] in [facility]  Overnight no chest pain  No tele events  Vitals   Vitals:    10/26/17 1900 10/26/17 2300 10/27/17 0122 10/27/17 0300   BP: 99/64 98/54 105/56 103/62   Pulse: 101 101 98 80   Resp: 22 20  20   Temp: 97 6 °F (36 4 °C) 97 8 °F (36 6 °C)  98 7 °F (37 1 °C)   TempSrc: Oral Oral  Oral   SpO2: 94% 100%  100%   Weight:       Height:         Temp  Min: 97 6 °F (36 4 °C)  Max: 98 7 °F (37 1 °C)  IBW: 52 3 kg       Temp (24hrs), Av 3 °F (36 8 °C), Min:97 6 °F (36 4 °C), Max:98 7 °F (37 1 °C)    HR:  [] 80  Resp:  [15-26] 20  BP: ()/(54-83) 103/62  SpO2:  [94 %-100 %] 100 %      Hemodynamic Monitoring  N/A        Invasive/non-invasive ventilation settings   Respiratory    Lab Data (Last 4 hours)    None         O2/Vent Data (Last 4 hours)    None                Invasive  Devices  Invasive Devices     Peripheral Intravenous Line            Peripheral IV 10/26/17 Right Antecubital less than 1 day    Peripheral IV 10/27/17 Left Antecubital less than 1 day                  Physical Exam:  Physical Exam   Constitutional: He is oriented to person, place, and time  He appears well-developed and well-nourished  Non-toxic appearance  No distress  HENT:   Head: Normocephalic and atraumatic  Mouth/Throat: Uvula is midline and mucous membranes are normal    Eyes: Conjunctivae and EOM are normal  Pupils are equal, round, and reactive to light  Neck: Trachea normal  No tracheal tenderness present  No edema present  Cardiovascular: Normal rate, regular rhythm, S1 normal, S2 normal and normal pulses  Exam reveals no gallop  No murmur heard  Pulmonary/Chest: Effort normal and breath sounds normal  He has no wheezes  He has no rhonchi  He has no rales  Abdominal: Soft  Normal appearance and bowel sounds are normal  He exhibits no distension   There is no tenderness  Genitourinary:   Genitourinary Comments: Deferred   Musculoskeletal: Normal range of motion  He exhibits no edema  Neurological: He is alert and oriented to person, place, and time  He has normal strength  No cranial nerve deficit or sensory deficit  GCS eye subscore is 4  GCS verbal subscore is 5  GCS motor subscore is 6  Skin: Skin is warm, dry and intact  Capillary refill takes less than 2 seconds  Nursing note and vitals reviewed  Laboratory and Diagnostics:    Results from last 7 days  Lab Units 10/26/17  1344 10/26/17  0935   WBC Thousand/uL 8 79 11 07*   HEMOGLOBIN g/dL 15 9 16 7   HEMATOCRIT % 46 7 49 1   PLATELETS Thousands/uL 213 228   NEUTROS PCT %  --  78*   MONOS PCT %  --  11       Results from last 7 days  Lab Units 10/26/17  0935   SODIUM mmol/L 137   POTASSIUM mmol/L 3 6   CHLORIDE mmol/L 99*   CO2 mmol/L 31   BUN mg/dL 8   CREATININE mg/dL 0 99   CALCIUM mg/dL 9 1   TOTAL PROTEIN g/dL 7 9   BILIRUBIN TOTAL mg/dL 0 50   ALK PHOS U/L 99   ALT U/L 63   AST U/L 143*   GLUCOSE RANDOM mg/dL 122         No results found for: PHOS     Results from last 7 days  Lab Units 10/27/17  0116 10/26/17  1854 10/26/17  1344   INR   --   --  0 99   PTT seconds 42* 42* 36*     No components found for: ABG  No components found for: TROPONIN  Lab Results   Component Value Date    LACTICACID 0 9 10/26/2017     ABG:No results found for: PHART, ELU8SJO, PO2ART, EQQ6TGD, I3YGYBOU, BEART, SOURCE    Micro:  Blood Culture: No results found for: BLOODCX  Urine Culture: No results found for: URINECX  Sputum Culture: No components found for: SPUTUMCX  Wound Culure: No results found for: WOUNDCULT    Imaging:   I have personally reviewed pertinent films in PACS   ECHO:  Ejection fraction 35%  No regional wall motion abnormalities  Severe diffuse hypokinesis  Mild MR, TR, NC  Mildly dilated IVC    I/O   I/O       10/25 0701 - 10/26 0700 10/26 0701 - 10/27 0700    P  O   240    Total Intake(mL/kg) 240 (4 6)    Urine (mL/kg/hr)  400    Total Output   400    Net   -160                Intake/Output Summary (Last 24 hours) at 10/27/17 0546  Last data filed at 10/26/17 2300   Gross per 24 hour   Intake              240 ml   Output              400 ml   Net             -160 ml       Active medications  Scheduled Meds:    aspirin 81 mg Oral Daily   chlorhexidine 15 mL Swish & Spit Q12H [address]   metoprolol tartrate 12 5 mg Oral Q12H [address]     Continuous Infusions:    heparin (porcine) 3-20 Units/kg/hr (Order-Specific) Last Rate: 20 Units/kg/hr (10/27/17 0246)     PRN Meds:   heparin (porcine)    heparin (porcine)    Diet       Diet Orders            Start     Ordered    10/26/17 1231  Diet Cardiac; Cardiac Step 1  Diet effective now     Question Answer Comment   Diet Type Cardiac    Cardiac Cardiac Step 1    RD to adjust diet per protocol? Yes        10/26/17 1231            VTE Pharmacologic Prophylaxis: Heparin  VTE Mechanical Prophylaxis: sequential compression device      Code Status: Level 1 - Full Code  Advance Directive and Living Will:      Power of :    POLST:      Portions of the record may have been created with voice recognition software  Occasional wrong word or "sound a like" substitutions may have occurred due to the inherent limitations of voice recognition software  Read the chart carefully and recognize, using context, where substitutions have occurred      Caitlin Landaverde PA-C

## 2017-10-27 NOTE — PROGRESS NOTES
Patient transported to  on bed on monitor, report given to [facility], care assumed  Assessed right radial puncture site with RN, site is clean, dry and intact  No signs or symptoms of bleeding or hematoma, cap refill is brisk  Fingers warm to touch and pink

## 2017-10-27 NOTE — PROGRESS NOTES
General Cardiology   Progress Note   Carissa Pandya 23 y o  male MRN: [id number]  Unit/Bed#:  Encounter: [id number]      SUBJECTIVE:   No significant events overnight  I am feeling good  Denies more chest pain    REVIEW OF SYSTEMS:  Constitutional:  Denies fever or chills   Eyes:  Denies change in visual acuity   HENT:  Denies nasal congestion or sore throat   Respiratory:  Denies cough or shortness of breath   Cardiovascular:  Denies chest pain or edema   GI:  Denies abdominal pain, nausea, vomiting, bloody stools or diarrhea   :  Denies dysuria, frequency, difficulty in micturition and nocturia  Musculoskeletal:  Denies back pain or joint pain   Neurologic:  Denies headache, focal weakness or sensory changes   Endocrine:  Denies polyuria or polydipsia   Lymphatic:  Denies swollen glands   Psychiatric:  Denies depression or anxiety     OBJECTIVE:   Vitals:  Vitals:    10/27/17 0900   BP: 113/66   Pulse: 82   Resp:    Temp:    SpO2:      Body mass index is 23 41 kg/m²  Systolic (09MIA), WVN:371 , Min:98 , VJU:522     Diastolic (72YHZ), XNW:30, Min:54, Max:74      Intake/Output Summary (Last 24 hours) at 10/27/17 0944  Last data filed at 10/27/17 0904   Gross per 24 hour   Intake           354 97 ml   Output              400 ml   Net           -45 03 ml     Weight (last 2 days)     Date/Time   Weight    10/27/17 0549  56 2 (123 9)    10/26/17 1335  52 2 (115)    10/26/17 1232  52 5 (115 74)    10/26/17 0924  52 2 (115)              Telemetry Review:  Sinus rhythm    PHYSICAL EXAMS:  General:  Patient is not in acute distress, laying in the bed comfortably, awake, alert responding to commands  Head: Normocephalic, Atraumatic  HEENT:  Both pupils normal-size atraumatic, normocephalic, nonicteric  Neck:  JVP not raised  Trachea central  Respiratory:  Bronchovascular breathing all over the chest without any accompaniment  Cardiovascular:  S1-S2 normal   Regular rate and rhythm   No murmurs, rubs or gallops noted  GI:  Abdomen soft nontender   Liver and spleen normal size  Lymphatic:  No cervical or inguinal lymphadenopathy  Neurologic:  Patient is awake alert, responding to command, well-oriented to time and place and person moving     LABORATORY RESULTS:    Results from last 7 days  Lab Units 10/27/17  0116 10/26/17  2155 10/26/17  1854   TROPONIN I ng/mL 13 77* 15 47* 16 22*       CBC with diff:   Results from last 7 days  Lab Units 10/26/17  1344 10/26/17  0935   WBC Thousand/uL 8 79 11 07*   HEMOGLOBIN g/dL 15 9 16 7   HEMATOCRIT % 46 7 49 1   MCV fL 87 87   PLATELETS Thousands/uL 213 228   MCH pg 29 7 29 7   MCHC g/dL 34 0 34 0   RDW % 13 0 12 9   MPV fL 10 1 10 0   NRBC AUTO /100 WBCs  --  0       CMP:  Results from last 7 days  Lab Units 10/26/17  0935   SODIUM mmol/L 137   POTASSIUM mmol/L 3 6   CHLORIDE mmol/L 99*   CO2 mmol/L 31   ANION GAP mmol/L 7   BUN mg/dL 8   CREATININE mg/dL 0 99   GLUCOSE RANDOM mg/dL 122   CALCIUM mg/dL 9 1   AST U/L 143*   ALT U/L 63   ALK PHOS U/L 99   TOTAL PROTEIN g/dL 7 9   ALBUMIN g/dL 3 8   BILIRUBIN TOTAL mg/dL 0 50   EGFR ml/min/1 73sq m 109       BMP:  Results from last 7 days  Lab Units 10/26/17  0935   SODIUM mmol/L 137   POTASSIUM mmol/L 3 6   CHLORIDE mmol/L 99*   CO2 mmol/L 31   BUN mg/dL 8   CREATININE mg/dL 0 99   GLUCOSE RANDOM mg/dL 122   CALCIUM mg/dL 9 1                      Results from last 7 days  Lab Units 10/27/17  0459   TSH 3RD GENERATON uIU/mL 2 230       Results from last 7 days  Lab Units 10/26/17  1344   INR  0 99       Cardiac testing:  Results for orders placed during the hospital encounter of 10/26/17   Echo complete with contrast if indicated    Narrative [address] [place] [address] [place] [address]  [phone]    Transthoracic Echocardiogram  2D, M-mode, Doppler, and Color Doppler    Study date:  26-Oct-2017    Patient: Roger Hardy  MR number: [id number]  Account number: [de-identified]  : 1995  Age: 23 years  Gender: Male  Status: Inpatient  Location: Emergency room  Height: 61 in  Weight: 115 lb  BP: 115/ 83 mmHg    Indications: Acute myocarditis, Assess left ventricular function  Diagnoses: I51 4 - Myocarditis, unspecified    Sonographer:  April [address] [facility] Sia Murray  Interpreting Physician:  Yo Cisse MD  Referring Physician:  Stephanie Pandey MD  Group:  [facility]    SUMMARY    LEFT VENTRICLE:  Systolic function was severely reduced  Ejection fraction was estimated to be 35 %  There were no regional wall motion abnormalities  There was severe diffuse hypokinesis  MITRAL VALVE:  There was mild regurgitation  TRICUSPID VALVE:  There was mild regurgitation  PULMONIC VALVE:  There was mild regurgitation  IVC, HEPATIC VEINS:  The inferior vena cava was moderately dilated  HISTORY: PRIOR HISTORY: Patient has no history of cardiovascular disease  PROCEDURE: The procedure was performed in the emergency room  This was a routine study  The transthoracic approach was used  The study included complete 2D imaging, M-mode, complete spectral Doppler, and color Doppler  The heart rate was  92 bpm, at the start of the study  Images were obtained from the parasternal, apical, subcostal, and suprasternal notch acoustic windows  Image quality was adequate  LEFT VENTRICLE: Size was normal  Systolic function was severely reduced  Ejection fraction was estimated to be 35 %  There were no regional wall motion abnormalities  There was severe diffuse hypokinesis  Wall thickness was normal   DOPPLER: Left ventricular diastolic function parameters were normal     RIGHT VENTRICLE: The size was normal  Systolic function was normal  Wall thickness was normal     LEFT ATRIUM: Size was normal     RIGHT ATRIUM: Size was normal     MITRAL VALVE: Valve structure was normal  There was normal leaflet separation  DOPPLER: The transmitral velocity was within the normal range   There was no evidence for stenosis  There was mild regurgitation  AORTIC VALVE: The valve was trileaflet  Leaflets exhibited normal thickness and normal cuspal separation  DOPPLER: Transaortic velocity was within the normal range  There was no evidence for stenosis  There was no regurgitation  TRICUSPID VALVE: The valve structure was normal  There was normal leaflet separation  DOPPLER: The transtricuspid velocity was within the normal range  There was no evidence for stenosis  There was mild regurgitation  PULMONIC VALVE: Leaflets exhibited normal thickness, no calcification, and normal cuspal separation  DOPPLER: The transpulmonic velocity was within the normal range  There was mild regurgitation  PERICARDIUM: There was no pericardial effusion  The pericardium was normal in appearance  AORTA: The root exhibited normal size  SYSTEMIC VEINS: IVC: The inferior vena cava was normal in size and course  The inferior vena cava was moderately dilated   Respirophasic changes were normal     SYSTEM MEASUREMENT TABLES    2D  %FS: 24 4 %  Ao Diam: 2 4 cm  EDV(Teich): 122 7 ml  EF(Teich): 48 1 %  ESV(Teich): 63 6 ml  IVSd: 0 6 cm  LA Area: 12 6 cm2  LA Diam: 2 8 cm  LVEDV MOD A4C: 100 9 ml  LVEF MOD A4C: 46 8 %  LVESV MOD A4C: 53 7 ml  LVIDd: 5 1 cm  LVIDs: 3 8 cm  LVLd A4C: 9 1 cm  LVLs A4C: 7 6 cm  LVPWd: 0 9 cm  RA Area: 15 cm2  RVIDd: 3 cm  SV MOD A4C: 47 2 ml  SV(Teich): 59 ml    CW  TR Vmax: 2 1 m/s  TR maxP mmHg    MM  TAPSE: 1 7 cm    PW  E': 0 1 m/s  E/E': 6 1  MV A Sudhakar: 0 6 m/s  MV Dec Dixon: 3 8 m/s2  MV DecT: 159 2 ms  MV E Sudhakar: 0 6 m/s  MV E/A Ratio: 1 1  MV PHT: 46 2 ms  MVA By PHT: 4 8 cm2    [employer] Accredited Echocardiography Laboratory    Prepared and electronically signed by    Lucia Moncada MD  Signed 26-Oct-2017 12:55:23         Meds/Allergies   all current active meds have been reviewed and current meds:   Current Facility-Administered Medications   Medication Dose Route Frequency    aspirin chewable tablet 81 mg  81 mg Oral Daily    chlorhexidine (PERIDEX) 0 12 % oral rinse 15 mL  15 mL Swish & Spit Q12H [facility]    heparin (porcine) 25,000 units in 250 mL infusion (premix)  3-20 Units/kg/hr (Order-Specific) Intravenous Titrated    heparin (porcine) injection 1,500 Units  1,500 Units Intravenous PRN    heparin (porcine) injection 3,000 Units  3,000 Units Intravenous PRN    lactated ringers infusion  100 mL/hr Intravenous Continuous    metoprolol tartrate (LOPRESSOR) partial tablet 12 5 mg  12 5 mg Oral Q12H [facility]     No prescriptions prior to admission  heparin (porcine) 3-20 Units/kg/hr (Order-Specific) Last Rate: Stopped (10/27/17 1279)   lactated ringers 100 mL/hr Last Rate: 100 mL/hr (10/27/17 0904)       ASSESSMENT & PLAN   1   NSTEMI likely type 2 secondary to myocarditis  Troponin maxed at 21 now trending downward  Echocardiogram done at the bedside showed ejection fraction 35%, severe diffuse hypokinesis  CRP was elevated  Sed rate was negative  Lyme is pending  HIV was negative  Continue aspirin, Lopressor, heparin drip to be stopped prior to catheterization  Discussed the need for cardiac catheterization to assess for CAD  Risk and benefits have been reviewed  Patient was quoted a risk of 1% for bleeding, infection, stroke, heart attack  Given his risk patient wishes to proceed  Consent has been obtained  Patient will kept NPO  All questions have been answered  Further recommendations will based upon cardiac catheterization findings  Deepak Melton PA-C  10/27/2017,9:44 AM    Portions of the record may have been created with voice recognition software   Occasional wrong word or "sound a like" substitutions may have occurred due to the inherent limitations of voice recognition software   Read the chart carefully and recognize, using context, where substitutions have occurred

## 2017-10-27 NOTE — CASE MANAGEMENT
Initial Clinical Review    Admission: Date/Time/Statement: 10/26/17 @ 1156     Orders Placed This Encounter   Procedures    Inpatient Admission (expected length of stay for this patient is greater than two midnights)     Standing Status:   Standing     Number of Occurrences:   1     Order Specific Question:   Admitting Physician     Answer:   Ryan Renner [[id number]]     Order Specific Question:   Level of Care     Answer:   Critical Care [15]     Order Specific Question:   Estimated length of stay     Answer:   More than 2 Midnights     Order Specific Question:   Certification     Answer:   I certify that inpatient services are medically necessary for this patient for a duration of greater than two midnights  See H&P and MD Progress Notes for additional information about the patient's course of treatment  ED: Date/Time/Mode of Arrival:   ED Arrival Information     Expected Arrival Acuity Means of Arrival Escorted By Service Admission Type    - 10/26/2017 09:18 Urgent Walk-In Family Member Critical Care/ICU Urgent    Arrival Complaint    Chest pain (x2 days)          Chief Complaint:   Chief Complaint   Patient presents with    Chest Pain     Pt with chest pain x's 2 days, patient was to "go see a heart specialist for heart problems but never went"  Pt c/o pain when he takes a deep breath in        History of Illness: Ezra Bucio is a 23 y o  male who presents with no past medical history  He states that he had chest pain approximately 1 year ago, in which he sought care at an urgent care center  He does not recall the surrounding context of the chest pain at this time  But, feels that "it came out of no where"  He was encouraged to seek care from a cardiologist   However, he did not  He stated that he has had chest pain intermittently over the course of the year  Over the past couple of weeks he describes increasing shortness of breath and chest pain, associated with fatigue    He states that the pain is on the left side of his chest that worsens with pressure and inspiration  He had chest pain today that was associated with dizziness and he felt like he "he passed out for 5 seconds" and this had not happened before which prompted his admission  He admits to smoking cigarettes and occasionally drinking alcohol  He denies and ilicit drug use  He denies any sick contacts  He is not an athlete or play contact sports  He denies a family history of CAD  ED Vital Signs:   ED Triage Vitals [10/26/17 0924]   Temperature Pulse Respirations Blood Pressure SpO2   98 6 °F (37 °C) 103 16 115/83 99 %      Temp Source Heart Rate Source Patient Position - Orthostatic VS BP Location FiO2 (%)   Oral Monitor Sitting Right arm --      Pain Score       4        Wt Readings from Last 1 Encounters:   10/27/17 56 2 kg (123 lb 14 4 oz)       Vital Signs (abnormal): wnl     Abnormal Labs/Diagnostic Test Results: CRP   >90 0, trop   21 61, cl   99, ast   143, wbc   11 07  CXR - wnl   EKG- ST     ED Treatment:   Medication Administration from 10/26/2017 0918 to 10/26/2017 1314     None          Past Medical/Surgical History: Active Ambulatory Problems     Diagnosis Date Noted    No Active Ambulatory Problems     Resolved Ambulatory Problems     Diagnosis Date Noted    No Resolved Ambulatory Problems     No Additional Past Medical History       Admitting Diagnosis: Chest pain [R07 9]  Subacute myocarditis, unspecified myocarditis type [I40 9]    Age/Sex: 23 y o  male    Assessment/Plan: Assessment:   1  Pleuritic Chest pain myocarditis vs  Costochondritis   2  Cardiomyopathy of unknown etiology rule out CAD  3  Tobacco abuse  4   Alcohol use   Plan:                Neuro:   -monitor neuro status  -CAM ICU   -sleep hygiene   -UDS negative               CV:   -ECHO showing severely decreased systolic function, EF 35% with no regional wall motion abnormalities and severe diffuse hypokinesis   -trend troponins -start ASA  -start heparin gtt  -start low dose BB  -monitor closely for arrhythmia   -cardiology following, appreciate recommendations  -will likely have cardiac catheterization on Monday               Lung:   -monitor respiratory status closely  -early mobility               GI:   -cardiac diet as tolerated  -NPO Sunday evening               FEN:   -monitor electrolytes and replete as necessary               :   -monitor urine output closely               ID:   -no current acute infectious etiology   -will check viral panel and coxsackie A/B to assess for viral source of myocarditis               Heme:   -heparin gtt for ACS, this will also cover for dvt prophylaxis              Endo:   -monitor glucose via bmp               Msk/Skin:   -early mobilization               Disposition:   -will monitor in step down     VTE Pharmacologic Prophylaxis: Sequential compression device (Venodyne)  and Heparin  VTE Mechanical Prophylaxis: sequential compression device   Code Status: Level 1 - Full Code  Given critical illness, patient length of stay will require greater than two midnights    Counseling / Coordination of Care  Total Critical Care time spent 34 minutes excluding procedures, teaching and family updates        Admission Orders:  Scheduled Meds:   aspirin 81 mg Oral Daily   chlorhexidine 15 mL Swish & Spit Q12H [address]   metoprolol tartrate 12 5 mg Oral Q12H [address]     Continuous Infusions:   heparin (porcine) 3-20 Units/kg/hr (Order-Specific) Last Rate: Stopped (10/27/17 2336)   lactated ringers 100 mL/hr Last Rate: 100 mL/hr (10/27/17 0904)     PRN Meds: heparin (porcine)    heparin (porcine)     NPO   Daily weight   I&O   Neuro checks q4hr   Cardiac cath 10/27  O2 keep sat > 92 %   SCD  Serial trop   10/26 parovovirus B19 antibody , IGG, IGm , rapid HIV , tsh, lyme      Trop #2    21 82   #3  16 22   #4  15 47   #5  13 77    Critical Care progress note  10/27  Assessment and Plan  Principal Problem: Cardiomyopathy [facility])  Active Problems:    NSTEMI (non-ST elevated myocardial infarction) ([facility] )    Chest pain  Resolved Problems:    * No resolved hospital problems  *   Neuro:  No issues  UDS negative  Continue daily CAM assessments    CV:  Chest Pain unclear etiology  Appears to be pleuritic in quality  Viral panels as well as Lyme panel out for myocarditis  Cardiomyopathy unclear etiology  See above  Cardiology following  NSTEMI aspirin, nitro and beta-blocker  Cardiac catheterization on Monday per Cardiology  Continue on cardio/pulm monitoring    IV access:  Peripheral IVs    Pulm:  No issues  On room air oxygen  Encourage deep breathing/coughing/IS/PulmToileting at oxygen for saturations less than 92%   GI:  No issues  Bowel Reg:  None indicated  Stress Ulcer Prophylaxis none indicated     :  Normal BUN and creatinine  Follow up a m  labs  Accurate I and Os  F/E/N:  Follow up a m  labs   Diet: Cardiac Diet without IVF    Heme:    No issues  DVT Prophylaxis Hep gtt  SCDs in place bilaterally    ID: Afebrile  Mild leukocyte count on initial CBC but resolved on subsequent yesterday afternoon  Follow-up Lyme titer, follow-up respiratory PCR, follow-up Coxsackie PCRs    Endo:  No history of diabetes earlier thyroid dysfunction  Follow sugars on a m  chemistries   MSK/Skin:  Out of bed as tolerated with assistance  PT/OT consultation    Dispo:  Continue step-down level of care    Critical Care progress note  10/27   1   NSTEMI likely type 2 secondary to myocarditis  Troponin maxed at 21 now trending downward  Echocardiogram done at the bedside showed ejection fraction 35%, severe diffuse hypokinesis  CRP was elevated  Sed rate was negative  Lyme is pending  HIV was negative  Continue aspirin, Lopressor, heparin drip to be stopped prior to catheterization  Discussed the need for cardiac catheterization to assess for CAD  Risk and benefits have been reviewed    Patient was quoted a risk of 1% for bleeding, infection, stroke, heart attack  Given his risk patient wishes to proceed  Consent has been obtained  Patient will kept NPO  All questions have been answered  Further recommendations will based upon cardiac catheterization findings  Echo - LEFT VENTRICLE: Size was normal  Systolic function was severely reduced  Ejection fraction was estimated to be 35 %  There were no regional wall motion abnormalities  There was severe diffuse hypokinesis   Wall thickness was normal   DOPPLER: Left ventricular diastolic function parameters were normal

## 2017-10-27 NOTE — SOCIAL WORK
CM spoke to Dr Christine Lopez and David Gant  Pt needs a Zoll LifeVest   Necessary paperwork and records faxed  Will await approval and keep doctor, Pa, and patient informed

## 2017-10-27 NOTE — PROGRESS NOTES
Transfer Note - ICU Transfer to SD/MS tele   Janet Dopp 23 y o  male MRN: [id number]  [address] [facility]   Unit/Bed#:  Encounter: [id number]    Code Status: Level 1 - Full Code    Reason for ICU adm/HPI: 22 yo man with no significant PMH other than being told he should see a cardiologist 1 year ago for symptoms of chest pain, dizziness, fatigue, and SOB who presented to the ED with pleuritic type chest pain and was found to have significant troponin elevation along with severe diffuse hypokinesis by TTE with LVEF of 35%  Active problems:   Principal Problem:    Cardiomyopathy ([facility] [address] )  Active Problems:    Acute systolic heart failure (HCC)    N[facility]I (non-ST elevated [facility]) [facility] type 2    Chest pain  Resolved Problems:    * No resolved hospital problems  *    Consultants: Cardiology    Recent or scheduled procedures:  10/27 cardiac cath: per report- coronaries without disease  30/22 TTE: Systolic function was severely reduced  Ejection fraction was estimated to be 35 %  There were no regional wall motion abnormalities  There was severe diffuse hypokinesis  Outstanding/pending diagnostics: none    Cultures:  Viral serologies: pending    Lyme titer: pending         Mobilization Plan: PT/OT/OOB    Nutrition Plan: Advancing diet to regular    Discharge Plan:     Heart Failure:  Cardiology following  No active diuresis on-going  Lifevest being arranged by cardiology    PO metoprolol and ASA started by cardiology    Spoke with Dr Dinah Avalos [facility]) regarding transfer       Bill MARIUM Bui

## 2017-10-27 NOTE — PLAN OF CARE
Problem: DISCHARGE PLANNING - CARE MANAGEMENT  Goal: Discharge to post-acute care or home with appropriate resources  INTERVENTIONS:  - Conduct assessment to determine patient/family and health care team treatment goals, and need for post-acute services based on payer coverage, community resources, and patient preferences, and barriers to discharge  - Address psychosocial, clinical, and financial barriers to discharge as identified in assessment in conjunction with the patient/family and health care team  - Arrange appropriate level of post-acute services according to patient's   needs and preference and payer coverage in collaboration with the physician and health care team  - Communicate with and update the patient/family, physician, and health care team regarding progress on the discharge plan  - Arrange appropriate transportation to post-acute venues    Outcome: Progressing  CM spoke to Dr Edgardo Kimbrough and Akhil Davis  Pt needs a Zoll LifeVest   Necessary paperwork and records faxed  Will await approval and keep doctor, Pa, and patient informed

## 2017-10-27 NOTE — BRIEF OP NOTE (RAD/CATH)
Procedure Note    PATIENT NAME: Barrett Grey  : 1995  MRN: [id number]     Pre-op Diagnosis:   1  Subacute myocarditis, unspecified myocarditis type      Post-op Diagnosis:   1  Subacute myocarditis, unspecified myocarditis type        Surgeon:   Kassandra Grier MD    Estimated Blood Loss: 2 mL    Findings:   Normal coronary arteries  Normal LVEDP    Complications:  None    Anesthesia: Conscious sedation and Local     Plan:  Nonischemic cardio myopathy probably secondary to myocarditis   Medical management for now    Kassandra Grier MD     Date: 10/27/2017  Time: 2:56 PM

## 2017-10-28 VITALS
WEIGHT: 141.09 LBS | HEIGHT: 61 IN | SYSTOLIC BLOOD PRESSURE: 97 MMHG | HEART RATE: 80 BPM | BODY MASS INDEX: 26.64 KG/M2 | TEMPERATURE: 98.2 F | RESPIRATION RATE: 18 BRPM | DIASTOLIC BLOOD PRESSURE: 54 MMHG | OXYGEN SATURATION: 100 %

## 2017-10-28 PROBLEM — I40.9: Status: ACTIVE | Noted: 2017-10-28

## 2017-10-28 LAB
ANION GAP SERPL CALCULATED.3IONS-SCNC: 10 MMOL/L (ref 4–13)
B19V IGG SER IA-ACNC: 4 INDEX (ref 0–0.8)
B19V IGM SER IA-ACNC: 0.3 INDEX (ref 0–0.8)
BASOPHILS # BLD AUTO: 0.03 THOUSANDS/ΜL (ref 0–0.1)
BASOPHILS NFR BLD AUTO: 1 % (ref 0–1)
BUN SERPL-MCNC: 10 MG/DL (ref 5–25)
CALCIUM SERPL-MCNC: 9.2 MG/DL (ref 8.3–10.1)
CHLORIDE SERPL-SCNC: 103 MMOL/L (ref 100–108)
CO2 SERPL-SCNC: 26 MMOL/L (ref 21–32)
CREAT SERPL-MCNC: 0.78 MG/DL (ref 0.6–1.3)
CV B1 AB TITR SER CF: NEGATIVE {TITER}
CV B2 AB TITR SER CF: ABNORMAL {TITER}
CV B3 AB TITR SER CF: NEGATIVE {TITER}
CV B4 AB TITR SER CF: ABNORMAL {TITER}
CV B5 AB TITR SER CF: ABNORMAL {TITER}
CV B6 AB TITR SER CF: ABNORMAL {TITER}
EOSINOPHIL # BLD AUTO: 0.11 THOUSAND/ΜL (ref 0–0.61)
EOSINOPHIL NFR BLD AUTO: 2 % (ref 0–6)
ERYTHROCYTE [DISTWIDTH] IN BLOOD BY AUTOMATED COUNT: 12.8 % (ref 11.6–15.1)
GFR SERPL CREATININE-BSD FRML MDRD: 130 ML/MIN/1.73SQ M
GLUCOSE SERPL-MCNC: 96 MG/DL (ref 65–140)
HCT VFR BLD AUTO: 44.3 % (ref 36.5–49.3)
HGB BLD-MCNC: 15.1 G/DL (ref 12–17)
LYMPHOCYTES # BLD AUTO: 1.73 THOUSANDS/ΜL (ref 0.6–4.47)
LYMPHOCYTES NFR BLD AUTO: 30 % (ref 14–44)
MCH RBC QN AUTO: 29.8 PG (ref 26.8–34.3)
MCHC RBC AUTO-ENTMCNC: 34.1 G/DL (ref 31.4–37.4)
MCV RBC AUTO: 88 FL (ref 82–98)
MONOCYTES # BLD AUTO: 0.88 THOUSAND/ΜL (ref 0.17–1.22)
MONOCYTES NFR BLD AUTO: 15 % (ref 4–12)
NEUTROPHILS # BLD AUTO: 2.94 THOUSANDS/ΜL (ref 1.85–7.62)
NEUTS SEG NFR BLD AUTO: 52 % (ref 43–75)
NRBC BLD AUTO-RTO: 0 /100 WBCS
PLATELET # BLD AUTO: 267 THOUSANDS/UL (ref 149–390)
PMV BLD AUTO: 10.2 FL (ref 8.9–12.7)
POTASSIUM SERPL-SCNC: 3.7 MMOL/L (ref 3.5–5.3)
RBC # BLD AUTO: 5.06 MILLION/UL (ref 3.88–5.62)
SODIUM SERPL-SCNC: 139 MMOL/L (ref 136–145)
WBC # BLD AUTO: 5.72 THOUSAND/UL (ref 4.31–10.16)

## 2017-10-28 PROCEDURE — 80048 BASIC METABOLIC PNL TOTAL CA: CPT | Performed by: PHYSICIAN ASSISTANT

## 2017-10-28 PROCEDURE — 85025 COMPLETE CBC W/AUTO DIFF WBC: CPT | Performed by: PHYSICIAN ASSISTANT

## 2017-10-28 PROCEDURE — 93005 ELECTROCARDIOGRAM TRACING: CPT | Performed by: PHYSICIAN ASSISTANT

## 2017-10-28 PROCEDURE — 93005 ELECTROCARDIOGRAM TRACING: CPT

## 2017-10-28 RX ORDER — ASPIRIN 81 MG/1
81 TABLET, CHEWABLE ORAL DAILY
Status: DISCONTINUED | OUTPATIENT
Start: 2017-10-28 | End: 2017-10-28 | Stop reason: HOSPADM

## 2017-10-28 RX ORDER — LISINOPRIL 5 MG/1
5 TABLET ORAL DAILY
Qty: 30 TABLET | Refills: 2 | Status: SHIPPED | OUTPATIENT
Start: 2017-10-29 | End: 2018-05-04 | Stop reason: SDUPTHER

## 2017-10-28 RX ORDER — LISINOPRIL 5 MG/1
5 TABLET ORAL DAILY
Status: DISCONTINUED | OUTPATIENT
Start: 2017-10-28 | End: 2017-10-28 | Stop reason: HOSPADM

## 2017-10-28 RX ORDER — ASPIRIN 81 MG/1
81 TABLET, CHEWABLE ORAL DAILY
Refills: 0
Start: 2017-10-28 | End: 2018-05-04 | Stop reason: SDUPTHER

## 2017-10-28 RX ADMIN — ASPIRIN 81 MG 81 MG: 81 TABLET ORAL at 09:49

## 2017-10-28 NOTE — DISCHARGE SUMMARY
Discharge Summary - [facility]    Patient Information: Nhi Guevara 23 y o  male MRN: [id number]  Unit/Bed#: -01 Encounter: [id number]    Discharging Physician / Practitioner: Leora Ca MD  PCP: No primary care provider on file  Admission Date: 10/26/2017  Discharge Date: 10/28/17    Reason for Admission:  Non ST elevation MI /cardiomyopathy    Discharge Diagnoses:     Principal Problem:    Subacute myocarditis  Active Problems:    NSTEMI (non-ST elevated myocardial infarction) ([facility] [address] ), type 2    Cardiomyopathy ([facility] [address] )    Chest pain    Ac[facility] )  Resolved Problems:    * No resolved hospital problems  *    Present on Admission:   NSTEMI (non-ST elevated myocardial infarction) ([facility] [address] ), type 2   Cardiomyopathy ([facility] [address] )   Chest pain   Acute systolic heart failure ([facility] [address] )   Subacute myocarditis    Consultations During Hospital Stay:  · Cardiology    Procedures Performed:     · Echocardiogram showing a significantly reduced ejection fraction 35%  · Cardiac catheterization showing no Coronary artery disease-    Significant Findings:     · As above    Incidental Findings:   · None     Test Results Pending at Discharge (will require follow up): · None     Outpatient Tests Requested:  · None    Complications:  None    Hospital Course:     Nhi Guevara is a 23 y o  male patient who originally presented to the hospital on 10/26/2017 due to chest pain  He has been having these pains for almost a year  He was found to have significantly elevated troponin in addition to being failure  He was diuresed with IV diuretics  Treated with other medications remain the spleen, and beta-blocker  Was seen by Cardiology  He underwent cardiac catheterization which showed normal coronary arteries  He likely has myocarditis and nonischemic cardiomyopathy  He is being discharged home on ACE-inhibitor in addition a beta-blocker      Patient and family were advised to have patient follow up with primary care physician and with Cardiology service    Condition at Discharge: good     Discharge Day Visit / Exam:     Subjective:  Patient feels good today  He has no chest pain or shortness of breath  Denies any fever or chills  Denies any nausea or vomiting  Denies any abdominal pain  Vitals: Blood Pressure: 97/54 (10/28/17 1200)  Pulse: 80 (10/28/17 1200)  Temperature: 98 2 °F (36 8 °C) (10/28/17 1200)  Temp Source: Oral (10/28/17 1200)  Respirations: 18 (10/28/17 1200)  Height: 5' 1" (154 9 cm) (10/27/17 1530)  Weight - Scale: 64 kg (141 lb 1 5 oz) (10/28/17 0600)  SpO2: 100 % (10/28/17 0800)  Exam:        Vital signs are reviewed as above  Patient lying in bed  Not in any distress  Chest mostly clear to auscultation  S1 and S2 are audible  Abdomen is soft  It is nontender  Bowel sounds are audible  Mood and affect are pleasant  Oriented x3          Discharge instructions/Information to patient and family:   See after visit summary for information provided to patient and family  Provisions for Follow-Up Care:  See after visit summary for information related to follow-up care and any pertinent home health orders  Disposition:     Home    For Discharges to [facility] SNF:   · Not Applicable to this Patient - Not Applicable to this Patient    Planned Readmission:  None     Discharge Statement:  I spent 20 + minutes discharging the patient  This time was spent on the day of discharge  I had direct contact with the patient on the day of discharge  Greater than 50% of the total time was spent examining patient, answering all patient questions, arranging and discussing plan of care with patient as well as directly providing post-discharge instructions  Additional time then spent on discharge activities  Discharge Medications:  See after visit summary for reconciled discharge medications provided to patient and family        ** Please Note: Dragon 360 Dictation voice to text software may have been used in the creation of this document   **

## 2017-10-28 NOTE — SOCIAL WORK
CM spoke with Avery Murdock, Cardiology PA who reported pt was clear to discharge today  CM spoke with Luc Cho who reported he needs lease returned prior to delivery  CM spoke with CM Manager who reported vest was approved by  for 3 months  Per CMM, CM could sign lease and return  CM signed and faxed  CM spoke with Luc Cho who reported he would resubmit and then can assign fitter  CM awaiting call from Arielle Contreras

## 2017-10-28 NOTE — PHYSICIAN ADVISOR
Current patient class: Inpatient  The patient is currently on Hospital Day: 2      The patient was admitted to the hospital at  on 10/26/17 for the following diagnosis:  Chest pain [R07 9]  Subacute myocarditis, unspecified myocarditis type [I40 9]       There is documentation in the medical record of an expected length of stay of at least 2 midnights  The patient is therefore expected to satisfy the 2 midnight benchmark and given the 2 midnight presumption is appropriate for INPATIENT ADMISSION  Given this expectation of a satisfying stay, CMS instructs us that the patient is most often appropriate for inpatient admission under part A provided medical necessity is documented in the chart  After review of the relevant documentation, labs, vital signs and test results, the patient is appropriate for INPATIENT ADMISSION  Admission to the hospital as an inpatient is a complex decision making process which requires the practitioner to consider the patients presenting complaint, history and physical examination and all relevant testing  With this in mind, in this case, the patient was deemed appropriate for INPATIENT ADMISSION  After review of the documentation and testing available at the time of the admission I concur with this clinical determination of medical necessity  Rationale is as follows: The patient is a 23 yrs old Male who presented to the ED at 10/26/2017  9:23 AM with a chief complaint of Chest Pain (Pt with chest pain x's 2 days, patient was to "go see a heart specialist for heart problems but never went"  Pt c/o pain when he takes a deep breath in )    The patients vitals on arrival were ED Triage Vitals [10/26/17 0924]   Temperature Pulse Respirations Blood Pressure SpO2   98 6 °F (37 °C) 103 16 115/83 99 %      Temp Source Heart Rate Source Patient Position - Orthostatic VS BP Location FiO2 (%)   Oral Monitor Sitting Right arm --      Pain Score       4           History reviewed  No pertinent past medical history  History reviewed  No pertinent surgical history  Consults have been placed to:   IP CONSULT TO CASE MANAGEMENT  IP CONSULT TO NUTRITION SERVICES  IP CONSULT TO CARDIOLOGY  IP CONSULT TO CASE MANAGEMENT    Vitals:    10/27/17 1515 10/27/17 1530 10/27/17 1600 10/27/17 1700   BP: 98/55 109/59 109/59 110/66   Pulse: 71 91 90 90   Resp:  16 16 16   Temp:  98 °F (36 7 °C)     TempSrc:  Oral     SpO2:  98% 98% 98%   Weight:  56 2 kg (123 lb 14 4 oz)     Height:  5' 1" (1 549 m)         Most recent labs:    Recent Labs      10/26/17   0935  10/26/17   1344   10/27/17   0116   WBC  11 07*  8 79   --    --    HGB  16 7  15 9   --    --    HCT  49 1  46 7   --    --    PLT  228  213   --    --    K  3 6   --    --    --    NA  137   --    --    --    CALCIUM  9 1   --    --    --    BUN  8   --    --    --    CREATININE  0 99   --    --    --    INR   --   0 99   --    --    TROPONINI  21 61*   --    < >  13 77*   AST  143*   --    --    --    ALT  63   --    --    --    ALKPHOS  99   --    --    --    BILITOT  0 50   --    --    --     < > = values in this interval not displayed  Scheduled Meds:  aspirin 81 mg Oral Daily   metoprolol tartrate 12 5 mg Oral Q12H [address]     Continuous Infusions:  lactated ringers 100 mL/hr Last Rate: Stopped (10/27/17 0930)     PRN Meds:      Surgical procedures (if appropriate):

## 2017-10-28 NOTE — SOCIAL WORK
CM spoke with Roberta Berrios who reported pt would be fitted for vest tomorrow at home  CM notified cardiology PA  Pt has no other needs at this time

## 2017-10-28 NOTE — PLAN OF CARE
Problem: DISCHARGE PLANNING - CARE MANAGEMENT  Goal: Discharge to post-acute care or home with appropriate resources  INTERVENTIONS:  - Conduct assessment to determine patient/family and health care team treatment goals, and need for post-acute services based on payer coverage, community resources, and patient preferences, and barriers to discharge  - Address psychosocial, clinical, and financial barriers to discharge as identified in assessment in conjunction with the patient/family and health care team  - Arrange appropriate level of post-acute services according to patient's   needs and preference and payer coverage in collaboration with the physician and health care team  - Communicate with and update the patient/family, physician, and health care team regarding progress on the discharge plan  - Arrange appropriate transportation to post-acute venues    Outcome: Completed Date Met: 10/28/17  CM spoke with Joy Bartholomew who reported pt would be fitted for vest tomorrow at home  CM notified cardiology PA  Pt has no other needs at this time

## 2017-10-28 NOTE — PROGRESS NOTES
General Cardiology   Progress Note   Marcina Bumpers 23 y o  male MRN: [id number]  Unit/Bed#: -01 Encounter: [id number]      SUBJECTIVE:   No significant events overnight  I am feeling good  Denies any chest pain    REVIEW OF SYSTEMS:  Constitutional:  Denies fever or chills   Eyes:  Denies change in visual acuity   HENT:  Denies nasal congestion or sore throat   Respiratory:  Denies cough or shortness of breath   Cardiovascular:  Denies chest pain or edema   GI:  Denies abdominal pain, nausea, vomiting, bloody stools or diarrhea   :  Denies dysuria, frequency, difficulty in micturition and nocturia  Musculoskeletal:  Denies back pain or joint pain   Neurologic:  Denies headache, focal weakness or sensory changes   Endocrine:  Denies polyuria or polydipsia   Lymphatic:  Denies swollen glands   Psychiatric:  Denies depression or anxiety     OBJECTIVE:   Vitals:  Vitals:    10/28/17 1200   BP: 97/54   Pulse: 80   Resp: 18   Temp: 98 2 °F (36 8 °C)   SpO2:      Body mass index is 26 66 kg/m²  Systolic (51DNH), NGZ:287 , Min:97 , NQK:569     Diastolic (55SOD), OHK:50, Min:50, Max:66      Intake/Output Summary (Last 24 hours) at 10/28/17 1224  Last data filed at 10/27/17 2201   Gross per 24 hour   Intake           2737 5 ml   Output              500 ml   Net           2237 5 ml     Weight (last 2 days)     Date/Time   Weight    10/28/17 0600  64 (141 09)    10/27/17 1530  56 2 (123 9)    10/27/17 0549  56 2 (123 9)    10/26/17 1335  52 2 (115)    10/26/17 1232  52 5 (115 74)    10/26/17 0924  52 2 (115)              Telemetry Review:  Sinus rhythm  PHYSICAL EXAMS:  General:  Patient is not in acute distress, laying in the bed comfortably, awake, alert responding to commands  Head: Normocephalic, Atraumatic  HEENT:  Both pupils normal-size atraumatic, normocephalic, nonicteric  Neck:  JVP not raised   Trachea central  Respiratory:  Bronchovascular breathing all over the chest without any accompaniment  Cardiovascular:  S1-S2 normal  Regular rate and rhythm  GI:  Abdomen soft nontender   Liver and spleen normal size  Lymphatic:  No cervical or inguinal lymphadenopathy  Neurologic:  Patient is awake alert, responding to command, well-oriented to time and place and person moving     LABORATORY RESULTS:    Results from last 7 days  Lab Units 10/27/17  0116 10/26/17  2155 10/26/17  1854   TROPONIN I ng/mL 13 77* 15 47* 16 22*       CBC with diff:   Results from last 7 days  Lab Units 10/28/17  0603 10/26/17  1344 10/26/17  0935   WBC Thousand/uL 5 72 8 79 11 07*   HEMOGLOBIN g/dL 15 1 15 9 16 7   HEMATOCRIT % 44 3 46 7 49 1   MCV fL 88 87 87   PLATELETS Thousands/uL 267 213 228   MCH pg 29 8 29 7 29 7   MCHC g/dL 34 1 34 0 34 0   RDW % 12 8 13 0 12 9   MPV fL 10 2 10 1 10 0   NRBC AUTO /100 WBCs 0  --  0       CMP:  Results from last 7 days  Lab Units 10/28/17  0603 10/26/17  0935   SODIUM mmol/L 139 137   POTASSIUM mmol/L 3 7 3 6   CHLORIDE mmol/L 103 99*   CO2 mmol/L 26 31   ANION GAP mmol/L 10 7   BUN mg/dL 10 8   CREATININE mg/dL 0 78 0 99   GLUCOSE RANDOM mg/dL 96 122   CALCIUM mg/dL 9 2 9 1   AST U/L  --  143*   ALT U/L  --  63   ALK PHOS U/L  --  99   TOTAL PROTEIN g/dL  --  7 9   ALBUMIN g/dL  --  3 8   BILIRUBIN TOTAL mg/dL  --  0 50   EGFR ml/min/1 73sq m 130 109       BMP:  Results from last 7 days  Lab Units 10/28/17  0603 10/26/17  0935   SODIUM mmol/L 139 137   POTASSIUM mmol/L 3 7 3 6   CHLORIDE mmol/L 103 99*   CO2 mmol/L 26 31   BUN mg/dL 10 8   CREATININE mg/dL 0 78 0 99   GLUCOSE RANDOM mg/dL 96 122   CALCIUM mg/dL 9 2 9 1                      Results from last 7 days  Lab Units 10/27/17  0459   TSH 3RD GENERATON uIU/mL 2 230       Results from last 7 days  Lab Units 10/26/17  1344   INR  0 99         Cardiac testing:  Results for orders placed during the hospital encounter of 10/26/17   Echo complete with contrast if indicated    Narrative [address]  [place], [place] [address]  [phone]    Transthoracic Echocardiogram  2D, M-mode, Doppler, and Color Doppler    Study date:  26-Oct-2017    Patient: Gladys Claros  MR number: [id number]  Account number: [de-identified]  : 1995  Age: 23 years  Gender: Male  Status: Inpatient  Location: Emergency room  Height: 61 in  Weight: 115 lb  BP: 115/ 83 mmHg    Indications: Acute myocarditis, Assess left ventricular function  Diagnoses: I51 4 - Myocarditis, unspecified    Sonographer:   [facility] Sia Murray  Interpreting Physician:  Leeroy Florence MD  Referring Physician:  Marcia Brown MD  Group:  [facility]    SUMMARY    LEFT VENTRICLE:  Systolic function was severely reduced  Ejection fraction was estimated to be 35 %  There were no regional wall motion abnormalities  There was severe diffuse hypokinesis  MITRAL VALVE:  There was mild regurgitation  TRICUSPID VALVE:  There was mild regurgitation  PULMONIC VALVE:  There was mild regurgitation  IVC, HEPATIC VEINS:  The inferior vena cava was moderately dilated  HISTORY: PRIOR HISTORY: Patient has no history of cardiovascular disease  PROCEDURE: The procedure was performed in the emergency room  This was a routine study  The transthoracic approach was used  The study included complete 2D imaging, M-mode, complete spectral Doppler, and color Doppler  The heart rate was  92 bpm, at the start of the study  Images were obtained from the parasternal, apical, subcostal, and suprasternal notch acoustic windows  Image quality was adequate  LEFT VENTRICLE: Size was normal  Systolic function was severely reduced  Ejection fraction was estimated to be 35 %  There were no regional wall motion abnormalities  There was severe diffuse hypokinesis   Wall thickness was normal   DOPPLER: Left ventricular diastolic function parameters were normal     RIGHT VENTRICLE: The size was normal  Systolic function was normal  Wall thickness was normal     LEFT ATRIUM: Size was normal     RIGHT ATRIUM: Size was normal     MITRAL VALVE: Valve structure was normal  There was normal leaflet separation  DOPPLER: The transmitral velocity was within the normal range  There was no evidence for stenosis  There was mild regurgitation  AORTIC VALVE: The valve was trileaflet  Leaflets exhibited normal thickness and normal cuspal separation  DOPPLER: Transaortic velocity was within the normal range  There was no evidence for stenosis  There was no regurgitation  TRICUSPID VALVE: The valve structure was normal  There was normal leaflet separation  DOPPLER: The transtricuspid velocity was within the normal range  There was no evidence for stenosis  There was mild regurgitation  PULMONIC VALVE: Leaflets exhibited normal thickness, no calcification, and normal cuspal separation  DOPPLER: The transpulmonic velocity was within the normal range  There was mild regurgitation  PERICARDIUM: There was no pericardial effusion  The pericardium was normal in appearance  AORTA: The root exhibited normal size  SYSTEMIC VEINS: IVC: The inferior vena cava was normal in size and course  The inferior vena cava was moderately dilated   Respirophasic changes were normal     SYSTEM MEASUREMENT TABLES    2D  %FS: 24 4 %  Ao Diam: 2 4 cm  EDV(Teich): 122 7 ml  EF(Teich): 48 1 %  ESV(Teich): 63 6 ml  IVSd: 0 6 cm  LA Area: 12 6 cm2  LA Diam: 2 8 cm  LVEDV MOD A4C: 100 9 ml  LVEF MOD A4C: 46 8 %  LVESV MOD A4C: 53 7 ml  LVIDd: 5 1 cm  LVIDs: 3 8 cm  LVLd A4C: 9 1 cm  LVLs A4C: 7 6 cm  LVPWd: 0 9 cm  RA Area: 15 cm2  RVIDd: 3 cm  SV MOD A4C: 47 2 ml  SV(Teich): 59 ml    CW  TR Vmax: 2 1 m/s  TR maxP mmHg    MM  TAPSE: 1 7 cm    PW  E': 0 1 m/s  E/E': 6 1  MV A Sudhakar: 0 6 m/s  MV Dec Baca: 3 8 m/s2  MV DecT: 159 2 ms  MV E Sudhakar: 0 6 m/s  MV E/A Ratio: 1 1  MV PHT: 46 2 ms  MVA By PHT: 4 8 cm2    [facility]    Prepared and electronically signed by    Lambert De Anda MD  Signed 26-Oct-2017 12:55:23       No results found for this or any previous visit  No results found for this or any previous visit  No procedure found  No results found for this or any previous visit  Meds/Allergies   all current active meds have been reviewed and current meds:   Current Facility-Administered Medications   Medication Dose Route Frequency    aspirin chewable tablet 81 mg  81 mg Oral Daily    lactated ringers infusion  100 mL/hr Intravenous Continuous    lisinopril (ZESTRIL) tablet 5 mg  5 mg Oral Daily    metoprolol tartrate (LOPRESSOR) partial tablet 12 5 mg  12 5 mg Oral Q12H [address]     No prescriptions prior to admission  lactated ringers 100 mL/hr Last Rate: Stopped (10/27/17 0930)       ASSESSMENT & PLAN   1  Subacute myocarditis--status post cardiac catheterization with no CAD noted  Continue all medication  Continue supportive care  Continue telemetry  2   Cardiomyopathy new--ejection fraction 35%  Continue with medications--aspirin, beta-blocker  Add low dose Ace inhibitor  Patient is Life Vest at discharge  Will reassess ejection fraction in approximately 3 months  Patient is stable from a cardiac standpoint to be discharged  Follow-up in the office in 1-2 weeks    Grupo Vicente PA-C  10/28/2017,12:24 PM    Portions of the record may have been created with voice recognition software   Occasional wrong word or "sound a like" substitutions may have occurred due to the inherent limitations of voice recognition software   Read the chart carefully and recognize, using context, where substitutions have occurred

## 2017-10-30 LAB
ADENOVIRUS: NOT DETECTED
ATRIAL RATE: 66 BPM
ATRIAL RATE: 72 BPM
B BURGDOR DNA SPEC QL NAA+PROBE: NEGATIVE
C PNEUM DNA SPEC QL NAA+PROBE: NOT DETECTED
CV A16 IGG TITR SER IF: ABNORMAL TITER
CV A16 IGM TITR SER IF: NEGATIVE TITER
CV A24 IGG TITR SER IF: ABNORMAL TITER
CV A24 IGM TITR SER IF: NEGATIVE TITER
CV A7 IGG TITR SER IF: ABNORMAL TITER
CV A7 IGM TITR SER IF: NEGATIVE TITER
CV A9 IGG TITR SER IF: ABNORMAL TITER
CV A9 IGM TITR SER IF: NEGATIVE TITER
FLUAV H1 RNA SPEC QL NAA+PROBE: NOT DETECTED
FLUAV H3 RNA SPEC QL NAA+PROBE: NOT DETECTED
FLUAV RNA SPEC QL NAA+PROBE: NOT DETECTED
FLUBV RNA SPEC QL NAA+PROBE: NOT DETECTED
HBOV DNA SPEC QL NAA+PROBE: NOT DETECTED
HCOV 229E RNA SPEC QL NAA+PROBE: NOT DETECTED
HCOV HKU1 RNA SPEC QL NAA+PROBE: NOT DETECTED
HCOV NL63 RNA SPEC QL NAA+PROBE: NOT DETECTED
HCOV OC43 RNA SPEC QL NAA+PROBE: NOT DETECTED
HPIV1 RNA SPEC QL NAA+PROBE: NOT DETECTED
HPIV2 RNA SPEC QL NAA+PROBE: NOT DETECTED
HPIV3 RNA SPEC QL NAA+PROBE: NOT DETECTED
HPIV4 RNA SPEC QL NAA+PROBE: NOT DETECTED
M PNEUMO DNA SPEC QL NAA+PROBE: NOT DETECTED
METAPNEUMOVIRUS: NOT DETECTED
P AXIS: 69 DEGREES
P AXIS: 73 DEGREES
PR INTERVAL: 126 MS
PR INTERVAL: 126 MS
QRS AXIS: -60 DEGREES
QRS AXIS: -60 DEGREES
QRSD INTERVAL: 88 MS
QRSD INTERVAL: 88 MS
QT INTERVAL: 386 MS
QT INTERVAL: 388 MS
QTC INTERVAL: 404 MS
QTC INTERVAL: 424 MS
RHINOVIRUS RNA SPEC QL NAA+PROBE: NOT DETECTED
RSV A RNA SPEC QL NAA+PROBE: NOT DETECTED
RSV B RNA SPEC QL NAA+PROBE: NOT DETECTED
T WAVE AXIS: -4 DEGREES
T WAVE AXIS: 1 DEGREES
VENTRICULAR RATE: 66 BPM
VENTRICULAR RATE: 72 BPM

## 2017-12-01 ENCOUNTER — GENERIC CONVERSION - ENCOUNTER (OUTPATIENT)
Dept: OTHER | Facility: OTHER | Age: 22
End: 2017-12-01

## 2017-12-12 ENCOUNTER — ALLSCRIPTS OFFICE VISIT (OUTPATIENT)
Dept: OTHER | Facility: OTHER | Age: 22
End: 2017-12-12

## 2017-12-12 DIAGNOSIS — I51.4 MYOCARDITIS (HCC): ICD-10-CM

## 2017-12-13 NOTE — PROGRESS NOTES
Assessment  Assessed    1  Subacute myocarditis, unspecified myocarditis type (422 90) (I40 9)   2  Non-ischemic cardiomyopathy (425 4) (I42 8)   3  Chest pain (786 50) (R07 9)    Plan  Myocarditis    · ECHO LIMITED WITH CONTRAST IF INDICATED; Status:Need Information - FinancialAuthorization; Requested for:[id number];    Perform:[facility] Radiology; Due:[id number]; Ordered;Ordered By:Keshav Boyer;    Discussion/Summary  Cardiology Discussion Summary Free Text Note Form [facility]:   1  Subacute myocarditis: Patient's last ECHO in October 2017 showed EF 35%, will have repeat ECHO within 3 months of last ECHO  Patient will f/u in Feb 2018  Nonischemic Cardiomyopathy: Last EF 35%, will have repeat ECHO and f/u in Feb 2018  Continue ASA, Lisinopril 5 mg, Lopressor 25 mg PO BID  Chest pain: likely residual from myocarditis, see above  Goals and Barriers: The patient has the current Goals: Medical management, get ECHO  The patent has the current Barriers: None  Chief Complaint  Chief Complaint Free Text Note Form: hosp f/u      History of Present Illness  Cardiology HPI Free Text Note Form [facility]: Patient presents for hospital f/u  Patient was treated for myocarditis and discharged in late Oct 2017  He presents today complaining of sharp chest pain, not worsened by deep breathing, lasting 5-10 seconds at a time  He denies leg swelling or weight gain  He had an ECHO during that hospitalization which revealed EF 35%  He is accompanied by 2 police officers as patient is in custody  Review of Systems  Cardiology Male ROS:    Cardiac: chest pain, but-- as noted in HPI  Skin: No complaints of nonhealing sores or skin rash  Genitourinary: No complaints of recurrent urinary tract infections, frequent urination at night, difficult urination, blood in urine, kidney stones, loss of bladder control, no kidney or prostate problems, no erectile dysfunction    Psychological: No complaints of feeling depressed, anxiety, panic attacks, or difficulty concentrating  General: No complaints of trouble sleeping, lack of energy, fatigue, appetite changes, weight changes, fever, frequent infections, or night sweats  Respiratory: No complaints of shortness of breath, cough with sputum, or wheezing  HEENT: No complaints of serious problems, hearing problems, nose problems, throat problems, or snoring  Gastrointestinal: No complaints of liver problems, nausea, vomiting, heartburn, constipation, bloody stools, diarrhea, problems swallowing, adbominal pain, or rectal bleeding  Hematologic: No complaints of bleeding disorders, anemia, blood clots, or excessive brusing  Neurological: No complaints of numbness, tingling, dizziness, weakness, seizures, headaches, syncope or fainting, AM fatigue, daytime sleepiness, no witnessed apnea episodes  Musculoskeletal: No complaints of arthritis, back pain, or painfull swelling  Active Problems  Problems    1  Hypertension (401 9) (I10)   2  Non-ischemic cardiomyopathy (425 4) (I42 8)   3  Subacute myocarditis, unspecified myocarditis type (422 90) (I40 9)    Past Medical History  Active Problems And Past Medical History Reviewed: The active problems and past medical history were reviewed and updated today  Surgical History  Surgical History Reviewed: The surgical history was reviewed and updated today  Family History  Family History Reviewed: The family history was reviewed and updated today  Social History  Problems    · Former smoker (B29 98) (D28 429)   · Minimum alcohol consumption  Social History Reviewed: The social history was reviewed and updated today  Current Meds   1  Aspirin EC 81 MG Oral Tablet Delayed Release; TAKE 1 TABLET DAILY; Therapy: 19IYR3139 to (Evaluate:12Mar2018) Recorded   2  Lisinopril 5 MG Oral Tablet; Take 1 tablet daily; Therapy: 63QNQ7618 to (Evaluate:80Hps6125) Recorded   3  Metoprolol Tartrate 25 MG Oral Tablet;  Take 1 tablet twice daily; Therapy: 96XKI2076 to (Evaluate:55Jlr0517) Recorded  Medication List Reviewed: The medication list was reviewed and updated today  Allergies  Medication    1  No Known Drug Allergies    Vitals  Vital Signs    Recorded: 53Gyp9251 03:28PM   Heart Rate 80   Systolic 368   Diastolic 66   Height 5 ft 3 in   Weight 154 lb 8 0 oz   BMI Calculated 27 37   BSA Calculated 1 73   O2 Saturation 98       Physical Exam   Constitutional  General appearance: No acute distress, well appearing and well nourished  Eyes  Conjunctiva and Sclera examination: Conjunctiva pink, sclera anicteric  Ears, Nose, Mouth, and Throat - Oropharynx: Clear, nares are clear, mucous membranes are moist   Neck  Neck and thyroid: Normal, supple, trachea midline, no thyromegaly  Pulmonary  Respiratory effort: No increased work of breathing or signs of respiratory distress  Auscultation of lungs: Clear to auscultation, no rales, no rhonchi, no wheezing, good air movement  Cardiovascular  Auscultation of heart: Normal rate and rhythm, normal S1 and S2, no murmurs  Carotid pulses: Normal, 2+ bilaterally  Peripheral vascular exam: Normal pulses throughout, no tenderness, erythema or swelling  Pedal pulses: Normal, 2+ bilaterally  Examination of extremities for edema and/or varicosities: Normal    Abdomen  Abdomen: Non-tender and no distention  Liver and spleen: No hepatomegaly or splenomegaly  Musculoskeletal Gait and station: Normal gait  -- Digits and nails: Normal without clubbing or cyanosis  -- Inspection/palpation of joints, bones, and muscles: Normal, ROM normal    Skin - Skin and subcutaneous tissue: Normal without rashes or lesions  Skin is warm and well perfused, normal turgor  Neurologic - Cranial nerves: II - XII intact  -- Speech: Normal    Psychiatric - Orientation to person, place, and time: Normal -- Mood and affect: Normal       Signatures   Electronically signed by : JACK Lo; Dec 12 2017  4:26PM EST                       (Author)    Electronically signed by : SHANICE Arredondo ; Dec 12 2017  6:55PM EST                       (Author)

## 2018-01-23 VITALS
BODY MASS INDEX: 27.38 KG/M2 | OXYGEN SATURATION: 98 % | HEART RATE: 80 BPM | SYSTOLIC BLOOD PRESSURE: 130 MMHG | DIASTOLIC BLOOD PRESSURE: 66 MMHG | HEIGHT: 63 IN | WEIGHT: 154.5 LBS

## 2018-05-01 ENCOUNTER — TELEPHONE (OUTPATIENT)
Dept: CARDIOLOGY CLINIC | Facility: CLINIC | Age: 23
End: 2018-05-01

## 2018-05-01 NOTE — TELEPHONE ENCOUNTER
Spoke with patient, he wanted to set up an appointment with Dr Carmen Carrasco, did not have the echo done yet, however  I transferred him to reception to make the appt

## 2018-05-04 ENCOUNTER — OFFICE VISIT (OUTPATIENT)
Dept: CARDIOLOGY CLINIC | Facility: CLINIC | Age: 23
End: 2018-05-04

## 2018-05-04 VITALS
DIASTOLIC BLOOD PRESSURE: 86 MMHG | HEART RATE: 73 BPM | SYSTOLIC BLOOD PRESSURE: 112 MMHG | HEIGHT: 61 IN | WEIGHT: 155 LBS | OXYGEN SATURATION: 98 % | BODY MASS INDEX: 29.27 KG/M2

## 2018-05-04 DIAGNOSIS — I42.9 CARDIOMYOPATHY, UNSPECIFIED TYPE (HCC): Primary | ICD-10-CM

## 2018-05-04 DIAGNOSIS — R07.9 CHEST PAIN, UNSPECIFIED TYPE: ICD-10-CM

## 2018-05-04 DIAGNOSIS — I40.8: ICD-10-CM

## 2018-05-04 DIAGNOSIS — I50.22 CHRONIC SYSTOLIC CHF (CONGESTIVE HEART FAILURE) (HCC): ICD-10-CM

## 2018-05-04 PROCEDURE — 99214 OFFICE O/P EST MOD 30 MIN: CPT | Performed by: INTERNAL MEDICINE

## 2018-05-04 RX ORDER — LISINOPRIL 5 MG/1
5 TABLET ORAL DAILY
Qty: 90 TABLET | Refills: 3 | Status: SHIPPED | OUTPATIENT
Start: 2018-05-04

## 2018-05-04 RX ORDER — ASPIRIN 81 MG/1
81 TABLET, CHEWABLE ORAL DAILY
Qty: 90 TABLET | Refills: 3 | Status: SHIPPED | OUTPATIENT
Start: 2018-05-04

## 2018-05-04 NOTE — PROGRESS NOTES
Cardiology Consultation     Xu Cherry  [id number]  1995  [address] [place] [id number]    c/c:  Follow-up of NICM    HPI:  61-year-old male with past medical history of myocarditis and acute systolic CHF is here for follow-up  Since last visit he is doing well and denies having any shortness of breath, lower extremity edema, orthopnea paroxysmal nocturnal dyspnea  Patient did have few episodes of dizziness but no syncope  Patient stopped taking medications 4 days ago because he ran out of medications  He is still wearing LifeVest but denies having any shocks  Chest pain has resolved    Patient did not get limited echocardiogram that was supposed to get last visit  Systolic function was severely reduced  Ejection fraction was estimated to be 35 %  There were no regional wall motion abnormalities  There was severe diffuse hypokinesis      MITRAL VALVE:  There was mild regurgitation      TRICUSPID VALVE:  There was mild regurgitation      PULMONIC VALVE:  There was mild regurgitation  Patient Active Problem List   Diagnosis    NSTEMI (non-ST elevated myocardial infarction) ([facility] ), type 2    Cardiomyopathy ([facility] )    Chest pain    Acute systolic heart failure ([facility] )    Subacute myocarditis     Past Medical History:   Diagnosis Date    Chest pain     Myocarditis ([facility] )     Non-ischemic cardiomyopathy ([facility] )     Subacute myocarditis      Social History     Social History    Marital status: Single     Spouse name: N/A    Number of children: N/A    Years of education: N/A     Occupational History    Not on file       Social History Main Topics    Smoking status: Light Tobacco Smoker     Packs/day: 0 25     Years: 1 00    Smokeless tobacco: Never Used      Comment: cut down    Alcohol use 1 2 oz/week     2 Cans of beer per week    Drug use: No    Sexual activity: Yes     Partners: Female     Birth control/ protection: None     Other Topics Concern    Not on file     Social History Narrative    No narrative on file      History reviewed  No pertinent family history  History reviewed  No pertinent surgical history  Current Outpatient Prescriptions:     lisinopril (ZESTRIL) 5 mg tablet, Take 1 tablet by mouth daily, Disp: 30 tablet, Rfl: 2    metoprolol tartrate (LOPRESSOR) 25 mg tablet, Take 0 5 tablets by mouth every 12 (twelve) hours, Disp: 30 tablet, Rfl: 2    aspirin 81 mg chewable tablet, Chew 1 tablet daily, Disp: , Rfl: 0  No Known Allergies  Vitals:    05/04/18 1056   BP: 112/86   Pulse: 73   SpO2: 98%   Weight: 70 3 kg (155 lb)   Height: 5' 1" (1 549 m)       Labs:  No visits with results within 2 Month(s) from this visit     Latest known visit with results is:   Admission on 10/26/2017, Discharged on 10/28/2017   Component Date Value    Sodium 10/26/2017 137     Potassium 10/26/2017 3 6     Chloride 10/26/2017 99*    CO2 10/26/2017 31     Anion Gap 10/26/2017 7     BUN 10/26/2017 8     Creatinine 10/26/2017 0 99     Glucose 10/26/2017 122     Calcium 10/26/2017 9 1     AST 10/26/2017 143*    ALT 10/26/2017 63     Alkaline Phosphatase 10/26/2017 99     Total Protein 10/26/2017 7 9     Albumin 10/26/2017 3 8     Total Bilirubin 10/26/2017 0 50     eGFR 10/26/2017 109     WBC 10/26/2017 11 07*    RBC 10/26/2017 5 62     Hemoglobin 10/26/2017 16 7     Hematocrit 10/26/2017 49 1     MCV 10/26/2017 87     MCH 10/26/2017 29 7     MCHC 10/26/2017 34 0     RDW 10/26/2017 12 9     MPV 10/26/2017 10 0     Platelets 11/09/7084 228     nRBC 10/26/2017 0     Neutrophils Relative 10/26/2017 78*    Lymphocytes Relative 10/26/2017 10*    Monocytes Relative 10/26/2017 11     Eosinophils Relative 10/26/2017 1     Basophils Relative 10/26/2017 0     Neutrophils Absolute 10/26/2017 8 62*    Lymphocytes Absolute 10/26/2017 1 11     Monocytes Absolute 10/26/2017 1 19     Eosinophils Absolute 10/26/2017 0 07     Basophils Absolute 10/26/2017 0 04     Ventricular Rate 10/26/2017 101     Atrial Rate 10/26/2017 101     CT Interval 10/26/2017 114     QRSD Interval 10/26/2017 76     QT Interval 10/26/2017 322     QTC Interval 10/26/2017 417     P Axis 10/26/2017 76     QRS Axis 10/26/2017 -60     T Wave Axis 10/26/2017 62     Troponin I 10/26/2017 21 61*    Amph/Meth UR 10/26/2017 Negative     Barbiturate Ur 10/26/2017 Negative     Benzodiazepine Urine 10/26/2017 Negative     Cocaine Urine 10/26/2017 Negative     Methadone Urine 10/26/2017 Negative     Opiate Urine 10/26/2017 Negative     PCP Ur 10/26/2017 Negative     THC Urine 10/26/2017 Negative     CRP 10/26/2017 >90 0*    Sed Rate 10/26/2017 7     [employer] A7 IGG 10/26/2017 1:1600*    [employer] A9 IGG 10/26/2017 1:1600*    [employer] A16 IGG 10/26/2017 1:1600*    [employer] A24 IGG 10/26/2017 1:1600*    [employer] A7 IGM 10/26/2017 Negative     [employer] A9 IGM 10/26/2017 Negative     [employer] A16 IGM 10/26/2017 Negative     [employer] A24 IGM 10/26/2017 Negative     Coxsackie B1 Ab 10/26/2017 Negative     Coxsackie B2 Ab 10/26/2017 1:32*    Coxsackie B3 Ab 10/26/2017 Negative     Coxsackie B4 Ab 10/26/2017 1:16*    Coxsackie B5 Ab 10/26/2017 1:64*    Coxsackie B6 Ab 10/26/2017 1:64*    Lyme Disease(B burgdorfe* 10/26/2017 Negative     INFLU A/MATRIX GENE 10/26/2017 Not Detected     INFLUENZA A/H1 10/26/2017 Not Detected     INFLUENZA A/H3 10/26/2017 Not Detected     INFLUENZA B 10/26/2017 Not Detected     RSV A 10/26/2017 Not Detected     RSV B 10/26/2017 Not Detected     Coronavirus 229E 10/26/2017 Not Detected     Coronavirus OC43 10/26/2017 Not Detected     Coronavirus NL63 10/26/2017 Not Detected     Coronavirus HKU1 10/26/2017 Not Detected     METAPNEUMOVIRUS 10/26/2017 Not Detected     RHINOVIRUS 10/26/2017 Not Detected     ADENOVIRUS 10/26/2017 Not Detected     PARAINFLUENZA 1 10/26/2017 Not Detected     PARAINFLUENZA 2 10/26/2017 Not Detected     PARAINFLUENZA 3 10/26/2017 Not Detected     Parainfluenza 4 10/26/2017 Not Detected     Human Bocavirus 10/26/2017 Not Detected     Chlamydophila pneumoniae 10/26/2017 Not Detected     Mycoplasma pneumoniae 10/26/2017 Not Detected     LACTIC ACID 10/26/2017 0 9     PTT 10/26/2017 36*    WBC 10/26/2017 8 79     RBC 10/26/2017 5 36     Hemoglobin 10/26/2017 15 9     Hematocrit 10/26/2017 46 7     MCV 10/26/2017 87     MCH 10/26/2017 29 7     MCHC 10/26/2017 34 0     RDW 10/26/2017 13 0     Platelets 36/39/5029 213     MPV 10/26/2017 10 1     Protime 10/26/2017 13 3     INR 10/26/2017 0 99     Troponin I 10/26/2017 21 82*    PTT 10/26/2017 42*    Troponin I 10/26/2017 16 22*    Troponin I 10/26/2017 15 47*    PTT 10/27/2017 42*    Troponin I 10/27/2017 13 77*    Parvovirus B19 IgG 10/27/2017 4 0*    Parvovirus B19 IgM 10/27/2017 0 3     TSH 3RD GENERATON 10/27/2017 2 230     Rapid HIV 1 AND 2 10/27/2017 Non-Reactive     HIV-1 P24 Ag Screen 10/27/2017 Non-Reactive     PTT 10/27/2017 57*    Ventricular Rate 10/27/2017 68     Atrial Rate 10/27/2017 68     UT Interval 10/27/2017 124     QRSD Interval 10/27/2017 84     QT Interval 10/27/2017 380     QTC Interval 10/27/2017 404     P Axis 10/27/2017 71     QRS Axis 10/27/2017 -54     T Wave Axis 10/27/2017 -55     WBC 10/28/2017 5 72     RBC 10/28/2017 5 06     Hemoglobin 10/28/2017 15 1     Hematocrit 10/28/2017 44 3     MCV 10/28/2017 88     MCH 10/28/2017 29 8     MCHC 10/28/2017 34 1     RDW 10/28/2017 12 8     MPV 10/28/2017 10 2     Platelets 84/46/1107 267     nRBC 10/28/2017 0     Neutrophils Relative 10/28/2017 52     Lymphocytes Relative 10/28/2017 30     Monocytes Relative 10/28/2017 15*    Eosinophils Relative 10/28/2017 2     Basophils Relative 10/28/2017 1     Neutrophils Absolute 10/28/2017 2 94     Lymphocytes Absolute 10/28/2017 1 73     Monocytes Absolute 10/28/2017 0 88     Eosinophils Absolute 10/28/2017 0 11     Basophils Absolute 10/28/2017 0 03     Sodium 10/28/2017 139     Potassium 10/28/2017 3 7     Chloride 10/28/2017 103     CO2 10/28/2017 26     Anion Gap 10/28/2017 10     BUN 10/28/2017 10     Creatinine 10/28/2017 0 78     Glucose 10/28/2017 96     Calcium 10/28/2017 9 2     eGFR 10/28/2017 130     Ventricular Rate 10/28/2017 66     Atrial Rate 10/28/2017 66     MS Interval 10/28/2017 126     QRSD Interval 10/28/2017 88     QT Interval 10/28/2017 386     QTC Interval 10/28/2017 404     P Axis 10/28/2017 69     QRS Axis 10/28/2017 -60     T Wave Axis 10/28/2017 -4     Ventricular Rate 10/28/2017 72     Atrial Rate 10/28/2017 72     MS Interval 10/28/2017 126     QRSD Interval 10/28/2017 88     QT Interval 10/28/2017 388     QTC Interval 10/28/2017 424     P Axis 10/28/2017 73     QRS [place] 10/28/2017 -60     T Wave Axis 10/28/2017 1      Imaging: No results found  Review of Systems:  Review of Systems   Constitutional: Negative for diaphoresis and fatigue  HENT: Negative for congestion and facial swelling  Eyes: Negative for photophobia and visual disturbance  Respiratory: Negative for chest tightness and shortness of breath  Cardiovascular: Negative for chest pain, palpitations and leg swelling  Gastrointestinal: Negative for abdominal pain and blood in stool  Endocrine: Negative for cold intolerance and heat intolerance  Musculoskeletal: Negative for arthralgias and myalgias  Skin: Negative for pallor and rash  Neurological: Positive for dizziness  Negative for syncope  Physical Exam:  Physical Exam   Constitutional: He is oriented to person, place, and time  He appears well-developed and well-nourished  HENT:   Head: Normocephalic and atraumatic     Eyes: Conjunctivae and EOM are normal  Pupils are equal, round, and reactive to light  Neck: Normal range of motion  Neck supple  No JVD present  No thyromegaly present  Cardiovascular: Normal rate, regular rhythm, normal heart sounds and intact distal pulses  Exam reveals no gallop and no friction rub  No murmur heard  Pulmonary/Chest: Effort normal and breath sounds normal  No respiratory distress  He has no wheezes  He has no rales  Abdominal: Soft  Bowel sounds are normal  He exhibits no distension  There is no tenderness  There is no rebound and no guarding  Musculoskeletal: Normal range of motion  He exhibits no edema  Neurological: He is alert and oriented to person, place, and time  He has normal reflexes  No cranial nerve deficit  Skin: Skin is warm and dry  Psychiatric: He has a normal mood and affect  Discussion/Summary:  Nonischemic cardiomyopathy/myocarditis  Continue ACE-inhibitor and beta-blocker  Check limited echocardiogram  If LV systolic function is more than 35%, DC life vest  Stress the importance of medication compliance with the patient    Continue life vest for now    Chest pain and positive troponin:   Chest pain has resolved, positive troponin due to myocarditis    Follow-up with me in 6 months

## 2018-06-01 ENCOUNTER — HOSPITAL ENCOUNTER (OUTPATIENT)
Dept: NON INVASIVE DIAGNOSTICS | Facility: CLINIC | Age: 23
Discharge: HOME | End: 2018-06-01

## 2018-06-01 DIAGNOSIS — I51.4 MYOCARDITIS (HCC): ICD-10-CM

## 2018-06-01 PROCEDURE — 93306 TTE W/DOPPLER COMPLETE: CPT

## 2018-06-04 PROCEDURE — 93306 TTE W/DOPPLER COMPLETE: CPT | Performed by: INTERNAL MEDICINE

## 2018-11-29 ENCOUNTER — TELEPHONE (OUTPATIENT)
Dept: CARDIOLOGY CLINIC | Facility: CLINIC | Age: 23
End: 2018-11-29